# Patient Record
Sex: FEMALE | Race: WHITE | NOT HISPANIC OR LATINO | Employment: OTHER | URBAN - METROPOLITAN AREA
[De-identification: names, ages, dates, MRNs, and addresses within clinical notes are randomized per-mention and may not be internally consistent; named-entity substitution may affect disease eponyms.]

---

## 2018-08-01 ENCOUNTER — TRANSFERRED RECORDS (OUTPATIENT)
Dept: HEALTH INFORMATION MANAGEMENT | Facility: CLINIC | Age: 78
End: 2018-08-01

## 2018-08-01 LAB
CHOLEST SERPL-MCNC: 221 MG/DL (ref 100–199)
CREAT SERPL-MCNC: 0.81 MG/DL (ref 0.57–1.11)
GFR SERPL CREATININE-BSD FRML MDRD: >60 ML/MIN/1.73M2
GLUCOSE SERPL-MCNC: 96 MG/DL (ref 65–100)
HDLC SERPL-MCNC: 58 MG/DL
LDLC SERPL CALC-MCNC: 146 MG/DL
NONHDLC SERPL-MCNC: 163 MG/DL
POTASSIUM SERPL-SCNC: 3.8 MMOL/L (ref 3.5–5)
TRIGL SERPL-MCNC: 87 MG/DL

## 2018-10-08 ENCOUNTER — TRANSFERRED RECORDS (OUTPATIENT)
Dept: HEALTH INFORMATION MANAGEMENT | Facility: CLINIC | Age: 78
End: 2018-10-08

## 2018-10-25 ENCOUNTER — OFFICE VISIT (OUTPATIENT)
Dept: OTHER | Facility: CLINIC | Age: 78
End: 2018-10-25
Attending: SURGERY
Payer: COMMERCIAL

## 2018-10-25 VITALS
WEIGHT: 210 LBS | DIASTOLIC BLOOD PRESSURE: 82 MMHG | SYSTOLIC BLOOD PRESSURE: 138 MMHG | HEART RATE: 72 BPM | BODY MASS INDEX: 37.21 KG/M2 | HEIGHT: 63 IN

## 2018-10-25 DIAGNOSIS — I65.23 BILATERAL CAROTID ARTERY STENOSIS: Primary | ICD-10-CM

## 2018-10-25 DIAGNOSIS — E78.5 HYPERLIPIDEMIA LDL GOAL <70: ICD-10-CM

## 2018-10-25 PROCEDURE — 99204 OFFICE O/P NEW MOD 45 MIN: CPT | Mod: ZP | Performed by: SURGERY

## 2018-10-25 PROCEDURE — G0463 HOSPITAL OUTPT CLINIC VISIT: HCPCS

## 2018-10-25 RX ORDER — GABAPENTIN 100 MG/1
100 CAPSULE ORAL
COMMUNITY
Start: 2018-09-27 | End: 2020-09-08

## 2018-10-25 RX ORDER — FUROSEMIDE 20 MG
20-40 TABLET ORAL
COMMUNITY
Start: 2018-08-01 | End: 2020-09-08

## 2018-10-25 RX ORDER — ROSUVASTATIN CALCIUM 20 MG/1
20 TABLET, COATED ORAL
COMMUNITY
Start: 2018-10-09 | End: 2020-09-08

## 2018-10-25 NOTE — MR AVS SNAPSHOT
After Visit Summary   10/25/2018    Narcisa Harris    MRN: [id number]           Patient Information     Date Of Birth          1940        Visit Information        Provider Department      10/25/2018 2:00 PM Peter Mazariegos MD [facility]      Today's Diagnoses     Bilateral carotid artery stenosis    -  1    Hyperlipidemia LDL goal <70          Care Instructions    Patient to follow up with Primary Care provider regarding elevated blood pressure.            Follow-ups after your visit        Your next 10 appointments already scheduled     Nov 01, 2018 12:00 PM CDT   (Arrive by 11:45 AM)   CTA  HEAD NECK WITH CONTRAST with SHCT1   [facility] ([facility])    [address] [place] [id number] [phone]           How do I prepare for my exam? (Food and drink instructions) **You will have contrast for this exam.** Do not eat or drink for 2 hours before your exam. If you need to take medicine, you may take it with small sips of water. (We may ask you to take liquid medicine as well.)  The day before your exam, drink extra fluids at least six 8-ounce glasses (unless your doctor tells you to restrict your fluids).  How do I prepare for my exam? (Other instructions) Patients over 70 or patients with diabetes or kidney problems: If you haven t had a blood test (creatinine test) within the last 30 days, the Cardiologist/Radiologist may require you to get this test prior to your exam.  What should I wear: Please wear loose clothing, such as a sweat suit or jogging clothes.  Avoid snaps, zippers and other metal. We may ask you to undress and put on a hospital gown.  How long does the exam take: Most scans take less than 20 minutes.  What should I bring: Please bring any scans or X-rays taken at other hospitals, if similar tests were done. Also bring a list of your medicines, including  vitamins, minerals and over-the-counter drugs. It is safest to leave personal items at home.  Do I need a :  No  is needed.  What do I need to tell my doctor? Be sure to tell your doctor: * If you have any allergies. * If there s any chance you are pregnant. * If you are breastfeeding. * If you have diabetes as your medication may need to be adjusted for this exam.  What should I do after the exam: No restrictions, You may resume normal activities.  What is this test: A CT (computed tomography) scan is a series of pictures that allows us to look inside your body. The scanner creates images of the body in cross sections, much like slices of bread. This helps us see any problems more clearly. You may receive contrast (X-ray dye) before or during your scan. Contrast is given through an IV (small needle in your arm).  Who should I call with questions: If you have any questions, please call the Imaging Department where you will have your exam. Directions, parking instructions, and other information is available on our website, [email or web address].              Future tests that were ordered for you today     Open Future Orders        Priority Expected Expires Ordered    CTA Head Neck with Contrast Routine 10/25/2018 10/25/2019 10/25/2018            Who to contact     If you have questions or need follow up information about today's clinic visit or your schedule please contact [facility] directly at [phone].  Normal or non-critical lab and imaging results will be communicated to you by MyChart, letter or phone within 4 business days after the clinic has received the results. If you do not hear from us within 7 days, please contact the clinic through MyChart or phone. If you have a critical or abnormal lab result, we will notify you by phone as soon as possible.  Submit refill requests through [employer] or call your pharmacy and they will forward the refill request to us. Please  "allow 3 business days for your refill to be completed.          Additional Information About Your Visit        Care EveryWhere ID     This is your Care EveryWhere ID. This could be used by other organizations to access your [facility] medical records  [id number]        Your Vitals Were     Pulse Height Breastfeeding? BMI (Body Mass Index)          72 5' 2.5\" (1.588 m) No 37.8 kg/m2         Blood Pressure from Last 3 Encounters:   10/25/18 138/82   09/27/13 146/62   06/21/13 146/56    Weight from Last 3 Encounters:   10/25/18 210 lb (95.3 kg)   09/27/13 227 lb 8 oz (103.2 kg)   06/21/13 232 lb (105.2 kg)               Primary Care Provider Office Phone # Fax #    Mhd Marry Bills [phone]       [facility] [address] [place] [address]        Equal Access to Services     AUTUMN SHARP AH: Hadii aad ku hadasho Soomaali, waaxda luqadaha, qaybta kaalmada adeegyada, waxay concepcionin hayfederico maya . So [facility] [phone].    ATENCIÓN: Si zoilala español, tiene a haq disposición servicios gratuitos de asistencia lingüística. Llame al [phone].    We comply with applicable federal civil rights laws and [place] laws. We do not discriminate on the basis of race, color, national origin, age, disability, sex, sexual orientation, or gender identity.            Thank you!     Thank you for choosing [facility] [place]  for your care. Our goal is always to provide you with excellent care. Hearing back from our patients is one way we can continue to improve our services. Please take a few minutes to complete the written survey that you may receive in the mail after your visit with us. Thank you!             Your Updated Medication List - Protect others around you: Learn how to safely use, store and throw away your medicines at www.disposemymeds.org.          This list is accurate as of 10/25/18  6:08 PM.  Always use your most recent med list.                   " Brand Name Dispense Instructions for use Diagnosis    aspirin 81 MG tablet      Take 1 tablet by mouth daily.        furosemide 20 MG tablet    LASIX     Take 20-40 mg by mouth        gabapentin 100 MG capsule    NEURONTIN     Take 100 mg by mouth        hydrochlorothiazide 25 MG tablet    HYDRODIURIL     Take 1 tablet by mouth daily.        losartan 25 MG tablet    COZAAR     Take 1 tablet by mouth daily.        rosuvastatin 20 MG tablet    CRESTOR     Take 20 mg by mouth

## 2018-10-25 NOTE — NURSING NOTE
"Narcisa Harris is a 78 year old female who presents for:  Chief Complaint   Patient presents with     Consult     New consult for carotid stenosis, patient self referred, had recent US at [facility]         Vitals:    Vitals:    10/25/18 1355 10/25/18 1358   BP: 149/80 138/82   BP Location: Right arm Left arm   Patient Position: Chair Sitting   Cuff Size: Adult Large Adult Large   Pulse: 71 72   Weight: 210 lb (95.3 kg)    Height: 5' 2.5\" (1.588 m)        BMI:  Estimated body mass index is 37.8 kg/(m^2) as calculated from the following:    Height as of this encounter: 5' 2.5\" (1.588 m).    Weight as of this encounter: 210 lb (95.3 kg).    Pain Score:  Data Unavailable        Celine Ambriz"

## 2018-10-25 NOTE — LETTER
[facility] [place] [address]  [place] [address]  Phone: [phone]  Fax: [phone]      2018    Re: Narcisa Harris - 1940    Narcisa Harris and her son came to see me today per recommendation of her physicians. This 78-year-old patient who is quite ndependent has known carotid stenosis.  Was discovered on a life screen exam several years ago and she has had yearly exams.  She has had a progressive worsening of the stenosis but remains asymptomatic and this led to a more formal carotid duplex ultrasound     Her brother is a patient of mine and underwent carotid surgery many years ago with no complications.     Patient has multiple nonspecific complaints.  She has had some headaches on with some soreness in her neck and back muscles but no focal neurological problems.  She does hear a bruit when she is lying down at night.     PMH: Medications: Cozaar, Lasix, aspirin, hydrochlorothiazide            Medical: Hypertension well controlled with medications.                          Denies any cardiac history.                          Dependent edema in both of her legs more on the left.                          Degenerative arthritis with prior left knee replacement                          -Suboptimal results and thus is not interested in s surgery on the right.                          Left carpal tunnel syndrome surgery with good results.     ROS: Never smoked.  No alcohol use.            Does experience shortness of breath but no chest pain with exertion.            Hyperlipidemia but difficulty tolerating all statins.            Tried Crestor more recently needed to stop this due to leg aches.            Complains of leg cramping at night.      FMH: Brother with carotid disease.      Exam: Very pleasant talkative woman.  Alert and orientated x3.  Here with son.             Blood pressure 149/80 right arm and 138/82 left arm.  Pulse 72 regular              Right carotid bruit noted.  HEENT= otherwise normal.             Chest= clear.   Cardiovascular= regular rate.             Abdomen= obese, nontender             Extremities= bilateral calf and ankle swelling but not in the dorsal foot.             +3 palpable dorsalis pedis pulses bilaterally.      Reviewed the images and reports from the carotid duplex from [facility] dated 10/8/2018.  ICA velocities are elevated bilaterally with visible narrowing of the origin of the ICA.  On the right PSV= from 191-241 cm/s.  Relatively normal end-diastolic velocities.  Some turbulence in the waveforms.  On the left there is again a visible stenosis of the origin of the ICA.  PSV= 205 cm/s with some more spectral broadening but still normal end-diastolic velocities.  Antegrade flow in both vertebral arteries.      8/1/2018 laboratory: Potassium= 3.8   Glucose= 96  serum creatinine= 0.81                                    Cholesterol= 221 triglycerides = 87   HDL= 58  LDL= 146.      Impression: #1.  Progressively increasing bilateral carotid stenosis which remains asymptomatic.  This is reached a point where the stenosis may be in the range of 70% at which juncture the stroke rate does slightly begin to increase.  Due to the progression and degree of stenosis presently I would recommend that we evaluate this further with a CTA.  If this confirms high-grade stenoses greater than 80% we would recommend carotid endarterectomy surgery.  I discussed the surgical procedure with our protocol of EEG-shunting if needed-vein patch angioplasty with her and her son.  Most patients stay overnight and are discharged the following day.  We will call her once the CTA images are available.                         #2.  No significant PAD with excellent palpable dorsalis pedis pulses bilaterally.                            #3.  Hyperlipidemia with markedly elevated LDL.  With her known carotid disease and possible silent cardiac disease more  aggressive control of the LDL is indicated with a goal being less than 70.  Unfortunately she has problems with statins at this may need to be evaluated further to see if there is a statin medication she could tolerate.                            #4.   Shortness of breath with exercise.  This likely is multifactorial.  Certainly can be related to her obesity and knee issues.  She may require further evaluation if she needs to undergo surgery.        We will call her with the results of the CTA to make further recommendations.        Peter Mazariegos MD

## 2018-10-25 NOTE — PROGRESS NOTES
[place] [facility]    Narcisa Harris and her son came to see me today per recommendation of her physicians.  This 78-year-old patient who is quite ndependent has known carotid stenosis.  Was discovered on a life screen exam several years ago and she has had yearly exams.  She has had a progressive worsening of the stenosis but remains asymptomatic and this led to a more formal carotid duplex ultrasound    Her brother is a patient of mine and underwent carotid surgery many years ago with no complications.    Patient has multiple nonspecific complaints.  She has had some headaches on with some soreness in her neck and back muscles but no focal neurological problems.  She does hear a bruit when she is lying down at night.    PMH: Medications: Cozaar, Lasix, aspirin, hydrochlorothiazide            Medical: Hypertension well controlled with medications.                           Denies any cardiac history.                           Dependent edema in both of her legs more on the left.                           Degenerative arthritis with prior left knee replacement                                        -Suboptimal results and thus is not interested in s                                                  surgery on the right.                            Left carpal tunnel syndrome surgery with good results.    ROS: Never smoked.  No alcohol use.            Does experience shortness of breath but no chest pain with exertion.             Hyperlipidemia but difficulty tolerating all statins.                  Tried Crestor more recently needed to stop this due to leg aches.             Complains of leg cramping at night.      FMH: Brother with carotid disease.      Exam: Very pleasant talkative woman.  Alert and orientated x3.  Here with son.              Blood pressure 149/80 right arm and 138/82 left arm.  Pulse 72 regular               Right carotid bruit noted.  HEENT= otherwise normal.                Chest=  clear.   Cardiovascular= regular rate.                 Abdomen= obese, nontender                 Extremities= bilateral calf and ankle swelling but not in the dorsal foot.                          +3 palpable dorsalis pedis pulses bilaterally.      Reviewed the images and reports [facility].  ICA velocities are elevated bilaterally with visible narrowing of the origin of the ICA.  On the right PSV= from 191-241 cm/s.  Relatively normal end-diastolic velocities.  Some turbulence in the waveforms.  On the left there is again a visible stenosis of the origin of the ICA.  PSV= 205 cm/s with some more spectral broadening but still normal end-diastolic velocities.  Antegrade flow in both vertebral arteries.      8/1/2018 laboratory: Potassium= 3.8   Glucose= 96  serum creatinine= 0.81                                    Cholesterol= 221 triglycerides = 87   HDL= 58                                    LDL= 146.      Impression: #1.  Progressively increasing bilateral carotid stenosis which remains asymptomatic.  This is reached a point where the stenosis may be in the range of 70% at which juncture the stroke rate does slightly begin to increase.  Due to the progression and degree of stenosis presently I would recommend that we evaluate this further with a CTA.  If this confirms high-grade stenoses greater than 80% we would recommend carotid endarterectomy surgery.  I discussed the surgical procedure with our protocol of EEG-shunting if needed-vein patch angioplasty with her and her son.  Most patients stay overnight and are discharged the following day.  We will call her once the CTA images are available.                        #2.  No significant PAD with excellent palpable dorsalis pedis pulses bilaterally.                           #3.  Hyperlipidemia with markedly elevated LDL.  With her known carotid disease and possible silent cardiac disease more aggressive control of the LDL  is indicated with a goal being less than 70.  Unfortunately she has problems with statins at this may need to be evaluated further to see if there is a statin medication she could tolerate.                           #4.   Shortness of breath with exercise.  This likely is multifactorial.  Certainly can be related to her obesity and knee issues.  She may require further evaluation if she needs to undergo surgery.      Spent 40 minutes in the office today with over 50% in counseling the patient and her son.  We will call her with the results of the CTA to make further recommendations.         Peter Mazariegos MD     Please route or send letter to:  Primary Care Provider (PCP) Dr Bills                           #3.   Bilateral calf edema.  Left appeared to be a exacerbated with her knee replacement surgery.  Skin is still soft and supple with no lesions.  Unlikely she will be able to appropriately wear compression stockings due to her body habitus.  Discussed with her and her son.                          Normal sensation.  No ulcers.

## 2018-11-01 ENCOUNTER — HOSPITAL ENCOUNTER (OUTPATIENT)
Dept: CT IMAGING | Facility: CLINIC | Age: 78
Discharge: HOME | End: 2018-11-01
Attending: SURGERY | Admitting: SURGERY
Payer: MEDICARE

## 2018-11-01 DIAGNOSIS — I65.23 BILATERAL CAROTID ARTERY STENOSIS: ICD-10-CM

## 2018-11-01 LAB
CREAT BLD-MCNC: 0.9 MG/DL (ref 0.52–1.04)
GFR SERPL CREATININE-BSD FRML MDRD: 61 ML/MIN/1.7M2

## 2018-11-01 PROCEDURE — 82565 ASSAY OF CREATININE: CPT

## 2018-11-01 PROCEDURE — 25000125 ZZHC RX 250: Performed by: SURGERY

## 2018-11-01 PROCEDURE — 25000128 H RX IP 250 OP 636: Performed by: SURGERY

## 2018-11-01 PROCEDURE — 70496 CT ANGIOGRAPHY HEAD: CPT

## 2018-11-01 RX ORDER — IOPAMIDOL 755 MG/ML
70 INJECTION, SOLUTION INTRAVASCULAR ONCE
Status: COMPLETED | OUTPATIENT
Start: 2018-11-01 | End: 2018-11-01

## 2018-11-01 RX ADMIN — SODIUM CHLORIDE, PRESERVATIVE FREE 90 ML: 5 INJECTION INTRAVENOUS at 11:44

## 2018-11-01 RX ADMIN — IOPAMIDOL 70 ML: 755 INJECTION, SOLUTION INTRAVENOUS at 11:44

## 2018-11-02 ENCOUNTER — TELEPHONE (OUTPATIENT)
Dept: OTHER | Facility: CLINIC | Age: 78
End: 2018-11-02

## 2018-11-02 DIAGNOSIS — I65.23 BILATERAL CAROTID ARTERY STENOSIS: Primary | ICD-10-CM

## 2018-11-02 NOTE — TELEPHONE ENCOUNTER
[facility]    Narcisa Harris was seen recently at the vascular office for evaluation of her asymptomatic carotid stenosis.  She noted progressively increasing velocities from the duplex performed at [facility] from 10/8/2018.  Due to these progressive changes we felt the CTA would be beneficial.    This was performed yesterday.  She does have calcification at the carotid bifurcations bilaterally.  However, on the right side the stenosis only 30%.  On the left this is 65%.  The left carotid stenosis would not warrant intervention at this time but continued follow-up is necessary and I discussed this with the patient.  We would repeat the carotid duplex ultrasound in 9 months to see if there is any compression particular in the left side.    However, we feel that risk factor control is very important due to her degree of carotid stenosis to help decrease progression.  Her recent LDL= 146.  She is not on a statin because she has had problems with this.  I do feel more aggressive control is indicated especially with her known vascular issues.  I will have my office set up an appointment with our vascular medicine specialist in my clinic to see if we can find a medication that she can tolerate to help control this problem.  Patient is very interested in addressing this problem.    She also complains that she has some swallowing issues and coughing episodes.  This occurred last night.  She asked who she could see it may be best that she starts with the ENT physician.      We will call her to schedule appointment with Dr. Simon or 1 of his associates vascular medicine in my office.  Also to schedule the 9-month carotid duplex follow-up.    Peter Mazariegos MD     Please route or send letter to:  Primary Care Provider (PCP)

## 2019-05-28 ENCOUNTER — TELEPHONE (OUTPATIENT)
Dept: OTHER | Facility: CLINIC | Age: 79
End: 2019-05-28

## 2019-05-28 NOTE — TELEPHONE ENCOUNTER
Reviewed imaging pt had done at [place].  No carotid imaging done, CT head/brain w/o contrast did not evaluate carotid arteries.  Pt to keep scheduled 5/30/19 bilateral carotid US and OV with Dr. Mazariegos.  Contacted pt and she verbalized understanding.    Tamera Mcmahon, LORENN, RN

## 2019-05-28 NOTE — TELEPHONE ENCOUNTER
Patient called to let us know that she fell last week and ended up in [facility] (05/23/19).  She hit her head, injured her knee cap and has a black eye. She has a full black knee brace on for 5-6 days. They did take quite a few XR's.  Patient is wondering if its ok for her to still have her US Bilat Carotid on Thursday (05/30/19) prior to her Dr Mazariegos appt? Her number is [phone] - she doesn't have an answering machine so please keep trying her if there's no answer.

## 2019-05-29 NOTE — PROGRESS NOTES
[place] [facility]    Narcisa Harris is a history of asymptomatic carotid stenosis.  This 79-year-old patient underwent a CTA in November 2018.  Both carotid bifurcations were calcified with a 30% stenosis in the right and 65% stenosis of the left.    Also history of significant hyperlipidemia with an LDL= 146.  She is presently on Crestor and I do not have results of her LDL on this medication.  Also takes aspirin, Cozaar, HCTZ and Lasix.    PMH: Medications reviewed in epic.    She lives independently.  She slipped and fell over this week due to new shoes.  She came down hitting her right knee with a significant ecchymosis but no fracture.  Also contused her right periorbital area and scalp with no fracture.  She is wearing a splint on her right leg due to the injury and using a wheelchair today but recovering.  She is confident that she actually tripped on her new shoes and this was not really related to a temporary weakness which could have represented a left hemispheric TIA.    She does noticed dependent edema of both her ankles at the end of the day.  She has compression stockings with it very difficult for her to wear.    Exam: Alert and appropriate.  Contusions over right periorbital area.  Wearing a removable splint on her right leg.  Wearing glasses that did not break with her fall.  Quite talkative.  Blood pressure 143/75.  Pulse 77.  HEENT= unremarkable with no carotid bruits  Chest= clear  Cardiovascular= regular rate  Extremities= heavyset calfs bilaterally.  No pedal edema.  Normal sensation.  +3 palpable dorsalis pedis pulses bilaterally.      Carotid duplex today reveals stable stenoses of the left greater than right carotid arteries.  Peak systolic velocity is 179 cm/s on the right and 210 cm/s in the left which is consistent with the prior velocities by duplex prior to the CTA.      Impression: #1.  Left greater than right moderately severe though asymptomatic stable carotid  stenosis.  No intervention indicated at this time unless symptoms should develop with the stenosis should progress.  Plan repeat duplex ultrasound 1 year.                         #2.  No evidence of peripheral artery disease with excellent distal pulses.  Discussed her dependent edema.  This is not a major concern.  As mentioned compression stockings are very difficult for her to wear and probably will not would not make a significant difference.                         #3.   With her carotid disease risk factor control is very important.  She is a non-smoker.  She is on Crestor for hyperlipidemia with the LDL goal less than 70 with her underlying issues.    I spent 20 minutes with the patient today with over 50% counseling.      Peter Mazariegos MD     CC  Patient Care Team:  Vivienne Bills MD as PCP - General (Family Practice)

## 2019-05-30 ENCOUNTER — HOSPITAL ENCOUNTER (OUTPATIENT)
Dept: ULTRASOUND IMAGING | Facility: CLINIC | Age: 79
Discharge: HOME | End: 2019-05-30
Attending: SURGERY | Admitting: SURGERY
Payer: MEDICARE

## 2019-05-30 ENCOUNTER — OFFICE VISIT (OUTPATIENT)
Dept: OTHER | Facility: CLINIC | Age: 79
End: 2019-05-30
Attending: SURGERY
Payer: MEDICARE

## 2019-05-30 VITALS — HEART RATE: 77 BPM | SYSTOLIC BLOOD PRESSURE: 143 MMHG | DIASTOLIC BLOOD PRESSURE: 75 MMHG

## 2019-05-30 DIAGNOSIS — I65.23 CAROTID STENOSIS, ASYMPTOMATIC, BILATERAL: Primary | ICD-10-CM

## 2019-05-30 DIAGNOSIS — E78.5 HYPERLIPIDEMIA LDL GOAL <70: ICD-10-CM

## 2019-05-30 DIAGNOSIS — I65.23 BILATERAL CAROTID ARTERY STENOSIS: ICD-10-CM

## 2019-05-30 PROCEDURE — G0463 HOSPITAL OUTPT CLINIC VISIT: HCPCS

## 2019-05-30 PROCEDURE — 99214 OFFICE O/P EST MOD 30 MIN: CPT | Mod: ZP | Performed by: SURGERY

## 2019-05-30 PROCEDURE — 93880 EXTRACRANIAL BILAT STUDY: CPT

## 2019-05-30 NOTE — PROGRESS NOTES
"Narcisa Harris is a 79 year old female who presents for:  Chief Complaint   Patient presents with     RECHECK     Bilateral Carotid (2:45 VHC, 3:45 WRO) History of bilateral carotid artery stenosis; 9 month follow up to 10/25/18 appointment         Vitals:    Vitals:    05/30/19 1455   BP: 143/75   BP Location: Left arm   Patient Position: Chair   Cuff Size: Adult Large   Pulse: 77       BMI:  Estimated body mass index is 37.8 kg/m  as calculated from the following:    Height as of 10/25/18: 5' 2.5\" (1.588 m).    Weight as of 10/25/18: 210 lb (95.3 kg).    Pain Score:  Data Unavailable        Santo Cheney    "

## 2019-05-30 NOTE — LETTER
[facility] [address]  [place] [address]  Phone: [phone]  Fax: [phone]      May 30, 2019       Re: Narcisa Harris - 1940    Narcisa Harris is a history of asymptomatic carotid stenosis.  This 79-year-old patient underwent a CTA in 2018.  Both carotid bifurcations were calcified with a 30% stenosis in the right and 65% stenosis of the left.     Also history of significant hyperlipidemia with an LDL= 146.  She is presently on Crestor and I do not have results of her LDL on this medication.  Also takes aspirin, Cozaar, HCTZ and Lasix.     PMH: Medications reviewed in epic.     She lives independently.  She slipped and fell over this week due to new shoes.  She came down hitting her right knee with a significant ecchymosis but no fracture.  Also contused her right periorbital area and scalp with no fracture.  She is wearing a splint on her right leg due to the injury and using a wheelchair today but recovering.  She is confident that she actually tripped on her new shoes and this was not really related to a temporary weakness which could have represented a left hemispheric TIA.     She does noticed dependent edema of both her ankles at the end of the day.  She has compression stockings with it very difficult for her to wear.     Exam: Alert and appropriate.  Contusions over right periorbital area.  Wearing a removable splint on her right leg.  Wearing glasses that did not break with her fall.  Quite talkative.  Blood pressure 143/75.  Pulse 77.  HEENT= unremarkable with no carotid bruits  Chest= clear  Cardiovascular= regular rate  Extremities= heavyset calfs bilaterally.  No pedal edema.  Normal sensation.  +3 palpable dorsalis pedis pulses bilaterally.      Carotid duplex today reveals stable stenoses of the left greater than right carotid arteries.  Peak systolic velocity is 179 cm/s on the right and 210 cm/s in the left which is consistent  with the prior velocities by duplex prior to the CTA.        Impression: #1.  Left greater than right moderately severe though asymptomatic stable carotid stenosis.  No intervention indicated at this time unless symptoms should develop with the stenosis should progress.  Plan repeat duplex ultrasound 1 year.                          #2.  No evidence of peripheral artery disease with excellent distal pulses.  Discussed her dependent edema.  This is not a major concern.  As mentioned compression stockings are very difficult for her to wear and probably will not would not make a significant difference.                          #3.   With her carotid disease risk factor control is very important.  She is a non-smoker.  She is on Crestor for hyperlipidemia with the LDL goal less than 70 with her underlying issues.             Peter Mazariegos MD

## 2020-04-17 DIAGNOSIS — I65.23 CAROTID STENOSIS, ASYMPTOMATIC, BILATERAL: Primary | ICD-10-CM

## 2020-05-18 ENCOUNTER — TELEPHONE (OUTPATIENT)
Dept: OTHER | Facility: CLINIC | Age: 80
End: 2020-05-18

## 2020-05-18 DIAGNOSIS — I65.23 BILATERAL CAROTID ARTERY STENOSIS: Primary | ICD-10-CM

## 2020-05-18 NOTE — TELEPHONE ENCOUNTER
"I called Narcisa and discussed that she is due for carotid US and 1 year follow up with Dr. Mazariegos.  Narcisa reports a few weeks ago she started experiencing neck pain when she lays down to go to sleep which appears to be positional. She reports dizziness once she wakes up in the morning that comes and goes with intermittent chest pain and some shortness of breath. She denies chest pain or shortness of breath at this moment but was advised to present to the ED should she develop persistent chest pain and she is in agreement. I also advised her to call her PCP and get in right away for evaluation of her multiple symptoms. \"I didn't want to bother anyone because of the COVID-19\" I encouraged Narcisa to always call if she is having symptoms or concerns. She verbalized she will call her PCP to be seen.    Routing to Dr. Mazariegos to advise on patients needed annual carotid surveillance and if patient needs US and what type of visit or if can wait post-covid.    Paris HARDY, RN    [facility]  [facility]  Office: [phone]  Fax: [phone]        "

## 2020-05-18 NOTE — TELEPHONE ENCOUNTER
May 18, 2020    Patient called [facility] to schedule f/u appt with [facility] that was due at the beginning of May.     Patient stated that she has been dizzy and lightheaded in the morning before she takes her BP meds and that her head is sore.     She has orders for Bilat Carotid US.     Routing to RN as high priority for review and advisement.     US and [facility] appts on 5/21/2020 are being reserved for patient.    Yeni Dang    [facility]  Office: [phone]  Fax [phone]

## 2020-05-21 PROBLEM — I10 HTN (HYPERTENSION): Status: ACTIVE | Noted: 2017-07-27

## 2020-05-21 PROBLEM — E78.2 MIXED HYPERLIPIDEMIA: Status: ACTIVE | Noted: 2017-07-27

## 2020-05-21 NOTE — TELEPHONE ENCOUNTER
Pt called back, reports she had imaging recently completed and was told by Dr. Ileana Hilton to call us for appointment soon due to severity.  Pt reports continued dizziness same as before, no further symptoms at this time.      LOV 5/30/19 with Dr. Mazariegos.      5/20/20 Bilateral Carotid U/S in CareEverywhere from [facility] shows   IMPRESSION:  1. Severe plaque formation, velocities consistent with greater than 70% stenosis in the right internal carotid artery.  2. Severe plaque formation, velocities consistent with greater than 70% stenosis in the left internal carotid artery.  3. Flow within the vertebral arteries is antegrade.  4. Elevated velocity in the right external carotid artery compatible with stenosis.     [facility], they will push image to PACS today.     Pt needs CTA head/neck and in person follow up with Dr. Yair arboleda.     Routing to  to coordinate.     LOREN RowleyN, RN  [facility]

## 2020-05-21 NOTE — TELEPHONE ENCOUNTER
May 21, 2020    Patient is scheduled for CTA on 5/27/2020 and in-person f/u appt on 6/4/2020 with [facility].     Yeni Dang    [facility]  Office: [phone]  Fax [phone]

## 2020-05-26 ENCOUNTER — TELEPHONE (OUTPATIENT)
Dept: OTHER | Facility: CLINIC | Age: 80
End: 2020-05-26

## 2020-05-26 NOTE — TELEPHONE ENCOUNTER
Patient called stating she just had CT of her head at [facility] with her PCP for dizziness and would like to know if she still needs the CT scan from Dr. Mazariegos.  I explained that Dr. Mazariegos ordered a CTA of her head and neck due to level of stenosis on her bilateral carotid US and this is not the same as a CT head. She verbalized understanding. She states she has been experiencing off and on symptoms of headaches, dizziness and chest pain and recently saw her PCP ([facility] care everywhere) for this and was recommended to follow up with her vascular MD. Narcisa is scheduled for her CTA head and neck tomorrow and her appointment with Dr. Mazariegos changed from 6/4/20 to 5/28/20 due to her intermittent symptoms. Patient instructed to present to the ED for any stroke like symptoms and she verbalized understanding.     Paris HARDY, RN    [facility]  Office: [phone]  Fax: [phone]

## 2020-05-27 ENCOUNTER — ANCILLARY PROCEDURE (OUTPATIENT)
Dept: CT IMAGING | Facility: CLINIC | Age: 80
End: 2020-05-27
Attending: SURGERY
Payer: MEDICARE

## 2020-05-27 DIAGNOSIS — I65.23 BILATERAL CAROTID ARTERY STENOSIS: ICD-10-CM

## 2020-05-27 PROCEDURE — 70498 CT ANGIOGRAPHY NECK: CPT | Mod: TC

## 2020-05-27 PROCEDURE — 70496 CT ANGIOGRAPHY HEAD: CPT | Mod: TC

## 2020-05-27 RX ORDER — IOPAMIDOL 755 MG/ML
70 INJECTION, SOLUTION INTRAVASCULAR ONCE
Status: COMPLETED | OUTPATIENT
Start: 2020-05-27 | End: 2020-05-27

## 2020-05-27 RX ADMIN — IOPAMIDOL 70 ML: 755 INJECTION, SOLUTION INTRAVASCULAR at 14:05

## 2020-05-27 NOTE — PROGRESS NOTES
[place] VASCULAR [facility]    Narcisa Harris is 80 years old and has a history of asymptomatic carotid stenosis.  CTA from November 2018 revealed a calcified stenosis bilateral with the right having a 30% stenosis in the left to 65% stenosis.    She also had a history of hyperlipidemia well controlled with Crestor along with hypertension.      Last carotid duplex ultrasound on 5/30/2019 revealed a right ICA PSV= 179 cm/s and left ICA PSV= 210 cm/s.    However, patient's had increasing somewhat vague neurological symptoms.  She will notice lightheadedness when she gets up.  Also notices a throbbing hot sensation to her midline upper scalp and sometimes to the right side of the scalp.  Occasional some blurry vision but not unilateral that rapidly resolves.  No focal neurological problems such as arm or leg weakness or speech changes.    She mentioned this to her physician.  Carotid duplex ultrasound was repeated on 5/20/2020 with a right ICA PSV =236 cm/s in the left PSV= 240 cm/s.  Since this was worse this led to the CTA in her office visit today.      PMH: Medications: Cozaar, Lasix, Neurontin, Crestor, HCTZ, aspirin           Non-smoker.  Lives independently.         No evidence of PAD with excellent distal pulses in the past.         Chronic bilateral calf swelling slightly improved in the morning  .           Try compression but they actually were too tight near her knee              Due to her body habitus and actually made this worse.              No history of leg ulcerations.      Her brother ( JOAQUIM Velazquez) is a patient of mine but undergone carotid surgery several years ago and has done well so patient does have an understanding of the surgery.    ROS: Does complain of a tingling sensation to her left forearm and hand that can awaken her at night.  This rapidly resolves.  Does tend to sleep on her left side which exacerbates this situation.  Has had previous carpal tunnel surgeries.      Underwent CTA  on 5/27/2020.  This revealed a less than 50% stenosis of the right carotid bifurcation and a severely 76% stenosis of the proximal left internal carotid artery that is worsened from the CTA performed on 11/1/2018.  No significant intracranial vascular disease.      Exam: Very pleasant alert woman.  Normal affect.  Obese.             Blood pressure 154/83.  Pulse 92 regular             Right greater than left carotid bruit.             Chest= clear to auscultation             Cardiovascular= regular rate with grade 1/6 murmur.              Abdomen= obese, nontender             Extremities= significant bilateral calf edema that stops at her ankles.             +3 palpable dorsalis pedis pulses bilaterally.  No ulcers.                     Normal sensation.    I reviewed the CTA images with the patient today.  Calcified plaque at the origin of the left ICA which is in normal location without a high bifurcation.  Stenosis of essentially 77% noted.  On the right side of the disease is less with a stenosis of 52%.  No major intra-cerebral disease and vertebral arteries are patent.    Impression: #1.  Progressively increasing left carotid stenosis now greater than 70%.  Her complaints of her head and vision are extremely vague and I do not feel related to any neurological process or carotid disease.  However, with the increasing stenosis there is certainly a chance of a embolus emanating from the plaque which could cause neurological event.  I do feel it is very reasonable to consider a left CEA.  We discussed the procedure with interoperative monitoring and shunting been performed and possible patch angioplasty depending on the size of the artery.  Most patients are able to be discharged the following day with no specific restrictions to her activities.  Patient is on the children's aspirin would continue on this up to the day of surgery.  For 1 month after surgery we use the baby aspirin along with Plavix or dipyridamole  as he artery site is healing.                            #2.  More moderate stenosis of the right carotid bifurcation.  Would recommend conservative follow-up.                            #3.   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  Discussed this with her.  No intervention planned.    Spent 30 minutes with the patient today with over 50% in counseling.  All questions answered.  She would like to proceed with the carotid surgery as soon as feasible.    Peter Mazariegos MD     CC  Patient Care Team:  Vivienne Bills MD as PCP - General (Family Practice)

## 2020-05-28 ENCOUNTER — HOSPITAL ENCOUNTER (INPATIENT)
Facility: CLINIC | Age: 80
Setting detail: SURGERY ADMIT
End: 2020-05-28
Attending: SURGERY | Admitting: SURGERY
Payer: MEDICARE

## 2020-05-28 ENCOUNTER — OFFICE VISIT (OUTPATIENT)
Dept: OTHER | Facility: CLINIC | Age: 80
End: 2020-05-28
Attending: SURGERY
Payer: MEDICARE

## 2020-05-28 VITALS — HEART RATE: 92 BPM | SYSTOLIC BLOOD PRESSURE: 154 MMHG | DIASTOLIC BLOOD PRESSURE: 82 MMHG

## 2020-05-28 DIAGNOSIS — E78.5 HYPERLIPIDEMIA LDL GOAL <70: ICD-10-CM

## 2020-05-28 DIAGNOSIS — I65.23 CAROTID STENOSIS, NON-SYMPTOMATIC, BILATERAL: Primary | ICD-10-CM

## 2020-05-28 DIAGNOSIS — I65.23 BILATERAL CAROTID ARTERY STENOSIS: Primary | ICD-10-CM

## 2020-05-28 PROCEDURE — G0463 HOSPITAL OUTPT CLINIC VISIT: HCPCS

## 2020-05-28 PROCEDURE — 99214 OFFICE O/P EST MOD 30 MIN: CPT | Mod: ZP | Performed by: SURGERY

## 2020-05-28 NOTE — NURSING NOTE
Patient Education    Procedure: LEFT CAROTID ENDARTERECTOMY WITH EEG  Diagnosis: LEFT CAROTID ARTERY STENOSIS  Anticoagulation Instruction: N/A  Pre-Operative Physical Exam: You need to have a pre-op physical exam within 30 days of your procedure. Your procedure may be cancelled if you do not have a current History and Physical. Call your PCP's office to schedule.  Allergies:  Updated in Epic  Bowel Prep: N/A  Post Procedure Education: [facility] patient post-procedure fact sheet reviewed with patient.    COVID-19 instructions for isolation and pre testing reviewed with pt.    Learner(s):patient  Method: Listening, Reading  Barriers to Learning:No Barrier  Outcome: Patient did verbalize understanding of above education.    Tamera Mcmahon, LORENN, RN-[facility]

## 2020-05-28 NOTE — NURSING NOTE
"Narcisa Harris is a 80 year old female who presents for:  Chief Complaint   Patient presents with     RECHECK     Follow-up to 5/30/19 with Dr. Mazariegos. Bilat Carotid US done by [facility] on 5/20/20. Requested images to be pushed to PACS. CTA HEAD/NECK in Epic 5/27/20 - 5/26/20 co        Vitals:    Vitals:    05/28/20 1521   BP: (!) 154/82   BP Location: Left arm   Patient Position: Chair   Cuff Size: Adult Large   Pulse: 92       BMI:  Estimated body mass index is 37.8 kg/m  as calculated from the following:    Height as of 10/25/18: 5' 2.5\" (1.588 m).    Weight as of 10/25/18: 210 lb (95.3 kg).    Pain Score:  Data Unavailable        Natali Scanlon MA    "

## 2020-05-29 DIAGNOSIS — Z11.59 ENCOUNTER FOR SCREENING FOR OTHER VIRAL DISEASES: Primary | ICD-10-CM

## 2020-06-08 ENCOUNTER — TELEPHONE (OUTPATIENT)
Dept: OTHER | Facility: CLINIC | Age: 80
End: 2020-06-08

## 2020-06-08 NOTE — TELEPHONE ENCOUNTER
Called Rica at [facility] back [phone], tried 2x, on hold greater than 5 minutes with no answer.     LOREN RowleyN, RN  [facility] Vascular [place]

## 2020-06-08 NOTE — TELEPHONE ENCOUNTER
[facility]    Who is the name of the provider?:  Yair        What is the location you see this provider at?: [place]    Reason for call:  Re new findings for upcoming surgery 6/12.  Needs possible cardiology intervention before surgery.     Can we leave a detailed message on this number?  YES

## 2020-06-10 NOTE — TELEPHONE ENCOUNTER
"Reviewed records via Care Everywhere.    Patient was admitted to [facility] on 6/8/20.    \"As part of her preoperative work up, she had a NM stress 6/5/20 that showed inferior and apical ischemia with left ventricular ejection fraction 83%. Coronary angiogram was recommended, but patient presented to emergency department with worsening exertional and some rest chest pain and back pain prior to that being scheduled.\"    6/9/20 coronary angiogram completed, multi-level vessel disease with recommendation for surgical revascularization.      Planned CABG for 6/11/20.  I contacted pt's son Matias, who is listed as emergency contact and confirmed with him that the carotid surgery will be postponed.  I requested that pt contact our clinic once she has recovered from the CABG to discuss timing of carotid surgery.  Matias verbalized understanding and appreciated the call.    Routing to surgery scheduler to cancel 6/12/20 surgery.    Tamera Mcmahon, LORENN, RN-[facility]    "

## 2020-08-13 ENCOUNTER — TELEPHONE (OUTPATIENT)
Dept: OTHER | Facility: CLINIC | Age: 80
End: 2020-08-13

## 2020-08-13 NOTE — TELEPHONE ENCOUNTER
Patient was scheduled with Dr. Mazariegos for a left carotid endarterectomy with EEG on 6/12/20. During her pre-op, she was sent for a stress test and then had to have heart surgery. Surgery was cancelled with Dr. Mazariegos.    Patient is now calling to reschedule. She states that she saw her cardiologist, Dr. Bradshaw at [facility], and he told her she was clear to reschedule the surgery. I told her that I wasn't sure if she will need another appointment with Dr. Mazariegos before rescheduling, and would call her back once I notify him.

## 2020-08-17 NOTE — TELEPHONE ENCOUNTER
RN spoke with patient and assisted scheduling patient for a return phone visit on 8/18/20 with Dr. Mazariegos.  Pt appreciated the call and had no further questions.    Tamera Mcmahon, BSN, RN-[facility] Vascular [place]

## 2020-08-18 ENCOUNTER — VIRTUAL VISIT (OUTPATIENT)
Dept: OTHER | Facility: CLINIC | Age: 80
End: 2020-08-18
Attending: SURGERY
Payer: MEDICARE

## 2020-08-18 VITALS — DIASTOLIC BLOOD PRESSURE: 54 MMHG | SYSTOLIC BLOOD PRESSURE: 144 MMHG

## 2020-08-18 DIAGNOSIS — I65.23 BILATERAL STENOSIS OF CAROTID ARTERIES GREATER THAN 50%: Primary | ICD-10-CM

## 2020-08-18 PROCEDURE — 99442 ZZC PHYSICIAN TELEPHONE EVALUATION 11-20 MIN: CPT | Performed by: SURGERY

## 2020-08-18 RX ORDER — OMEPRAZOLE 40 MG/1
CAPSULE, DELAYED RELEASE ORAL
COMMUNITY
Start: 2019-12-12 | End: 2020-09-08

## 2020-08-18 RX ORDER — METOPROLOL SUCCINATE 50 MG/1
TABLET, EXTENDED RELEASE ORAL
COMMUNITY
Start: 2020-06-05 | End: 2020-09-08

## 2020-08-18 RX ORDER — FUROSEMIDE 40 MG
40 TABLET ORAL
COMMUNITY
Start: 2020-06-25 | End: 2020-09-08

## 2020-08-18 RX ORDER — AMIODARONE HYDROCHLORIDE 200 MG/1
TABLET ORAL
COMMUNITY
Start: 2020-07-20 | End: 2020-09-08

## 2020-08-18 RX ORDER — POLYETHYLENE GLYCOL 3350 17 G/17G
17 POWDER, FOR SOLUTION ORAL
COMMUNITY
Start: 2020-06-30 | End: 2020-09-08

## 2020-08-18 RX ORDER — POTASSIUM CHLORIDE 750 MG/1
10 TABLET, EXTENDED RELEASE ORAL
COMMUNITY
Start: 2020-06-30 | End: 2020-09-08

## 2020-08-18 RX ORDER — ROSUVASTATIN CALCIUM 10 MG/1
10 TABLET, COATED ORAL EVERY OTHER DAY
Status: ON HOLD | COMMUNITY
Start: 2020-05-20 | End: 2022-09-08

## 2020-08-18 RX ORDER — CLOPIDOGREL BISULFATE 75 MG/1
75 TABLET ORAL DAILY
COMMUNITY
Start: 2020-07-10 | End: 2020-09-08

## 2020-08-18 NOTE — PROGRESS NOTES
"Narcisa Harris is a 80 year old female who is being evaluated via a billable telephone visit.      The patient has been notified of following:     \"This telephone visit will be conducted via a call between you and your physician/provider. We have found that certain health care needs can be provided without the need for a physical exam.  This service lets us provide the care you need with a short phone conversation.  If a prescription is necessary we can send it directly to your pharmacy.  If lab work is needed we can place an order for that and you can then stop by our lab to have the test done at a later time.    Telephone visits are billed at different rates depending on your insurance coverage. During this emergency period, for some insurers they may be billed the same as an in-person visit.  Please reach out to your insurance provider with any questions.    If during the course of the call the physician/provider feels a telephone visit is not appropriate, you will not be charged for this service.\"    Patient has given verbal consent for Telephone visit?  Yes    What phone number would you like to be contacted at? [phone]    How would you like to obtain your AVS? Mail a copy      Radha Mcmahon RN    "

## 2020-08-18 NOTE — PROGRESS NOTES
[facility]    Narcisa Harris has been scheduled for left carotid enterectomy.  Just before her preoperative physical with her physician she started noticing shortness of breath.  She saw her physician who performed a stress test that was abnormal.  She was sent to [facility] for  cardiac evaluation.  She underwent a cardiac bypass X3 6/2020 complicated by postoperative paroxysmal atrial fibrillation she saw her cardiologist Dr. Bradshaw at [facility] who cleared her for her carotid surgery.    Patient has done well following her cardiac surgery.  She does have chronic mild shortness of breath but much improved.  No discomfort with her median sternotomy.  She did develop a cellulitis in her left leg from the vein harvest which has resolved.    5/27/2020 CTA revealed a less than 50% stenosis right carotid bifurcation with a 76% stenosis of the left proximal ICA       PMH: Medications:  Crestor, Prilosec as needed,ASA, Toprol-XL                       Recently started apixaban 5 mg twice daily                scheduled for a Ziehl patch monitor in September      We had a telephone conference today with the ongoing COVID-19 pandemic I spoke with the patient and her son.  We were able to update her medications which are listed above.    I discussed the carotid surgery again with her and answered all questions.  We will have her hold her apixaban for 2 days prior to procedure (taking this for intermittent atrial fibrillation and does not need to be bridged).  She will take her Toprol-XL and Crestor and aspirin up to surgery.    Peter Mazariegos MD       Patient Care Team:  Vivienne Bills MD as PCP - General (Family Practice)        We spent 15 minutes on the phone today with over 50% in counseling.       Peter Mazariegos MD

## 2020-08-19 DIAGNOSIS — Z11.59 ENCOUNTER FOR SCREENING FOR OTHER VIRAL DISEASES: Primary | ICD-10-CM

## 2020-08-19 DIAGNOSIS — I65.23 CAROTID STENOSIS, ASYMPTOMATIC, BILATERAL: Primary | ICD-10-CM

## 2020-09-03 ENCOUNTER — TELEPHONE (OUTPATIENT)
Dept: OTHER | Facility: CLINIC | Age: 80
End: 2020-09-03

## 2020-09-03 NOTE — TELEPHONE ENCOUNTER
Type of surgery: LEFT CAROTID ENDARTERECTOMY WITH EEG   Location of surgery: [facility]  Date and time of surgery: 9/9/20 @ 10:55 AM  Surgeon: DR. MILLER  Pre-Op Appt Date: PT TO SCHEDULE  Post-Op Appt Date: PT TO SCHEDULE   Packet sent out: Yes  Pre-cert/Authorization completed:  Yes  Date: 9/3/20

## 2020-09-05 DIAGNOSIS — Z11.59 ENCOUNTER FOR SCREENING FOR OTHER VIRAL DISEASES: ICD-10-CM

## 2020-09-05 PROCEDURE — U0003 INFECTIOUS AGENT DETECTION BY NUCLEIC ACID (DNA OR RNA); SEVERE ACUTE RESPIRATORY SYNDROME CORONAVIRUS 2 (SARS-COV-2) (CORONAVIRUS DISEASE [COVID-19]), AMPLIFIED PROBE TECHNIQUE, MAKING USE OF HIGH THROUGHPUT TECHNOLOGIES AS DESCRIBED BY CMS-2020-01-R: HCPCS | Performed by: SURGERY

## 2020-09-06 LAB
SARS-COV-2 RNA SPEC QL NAA+PROBE: NOT DETECTED
SPECIMEN SOURCE: NORMAL

## 2020-09-08 ENCOUNTER — TELEPHONE (OUTPATIENT)
Dept: OTHER | Facility: CLINIC | Age: 80
End: 2020-09-08

## 2020-09-08 RX ORDER — METOPROLOL TARTRATE 25 MG/1
25 TABLET, FILM COATED ORAL 2 TIMES DAILY
COMMUNITY

## 2020-09-08 NOTE — PROGRESS NOTES
PTA medications updated by Medication Scribe prior to surgery via phone call with patient      -LAST DOSES ENTERED BY NURSE-    Comments:    Medication history sources: Patient, Patient's family/friend (SON -Tyson), Tod, H&P and Patient's home med list  Medication history source reliability: Good  Adherence assessment: N/A Not Observed    Significant changes made to the medication list:  None      Additional medication history information:   None        Prior to Admission medications    Medication Sig Last Dose Taking? Auth Provider   apixaban ANTICOAGULANT (ELIQUIS) 5 MG tablet Take 5 mg by mouth 2 times daily  Yes Reported, Patient   aspirin 81 MG tablet Take 1 tablet by mouth daily.  at AM Yes Reported, Patient   Ferrous Fumarate 63 (20 Fe) MG TABS Take 65 mg by mouth daily   at AM Yes Reported, Patient   metoprolol tartrate (LOPRESSOR) 25 MG tablet Take 25 mg by mouth 2 times daily  Yes Reported, Patient   omeprazole (PRILOSEC) 20 MG DR capsule Take 20 mg by mouth daily as needed   at PRN Yes Reported, Patient   rosuvastatin (CRESTOR) 10 MG tablet Take 10 mg by mouth At Bedtime   at HS Yes Reported, Patient

## 2020-09-08 NOTE — TELEPHONE ENCOUNTER
[place] VASCULAR [facility]    I called Narcisa Harris about her Left CEA tomorrow.  She had no questions about her coming surgery.    Her COVID-19 testing is negative from 9/5/2020.    She is very anxious about going into the operating room due to her recent heart surgery.  I told her we could sedate her lightly prior to entering the room and will discuss this with the anesthesia staff.      Peter Mazariegos MD

## 2020-09-09 ENCOUNTER — HOSPITAL ENCOUNTER (INPATIENT)
Facility: CLINIC | Age: 80
LOS: 3 days | Discharge: HOME | DRG: 038 | End: 2020-09-12
Attending: SURGERY | Admitting: SURGERY
Payer: MEDICARE

## 2020-09-09 ENCOUNTER — APPOINTMENT (OUTPATIENT)
Dept: SURGERY | Facility: PHYSICIAN GROUP | Age: 80
End: 2020-09-09
Payer: MEDICARE

## 2020-09-09 ENCOUNTER — ANESTHESIA (OUTPATIENT)
Dept: SURGERY | Facility: CLINIC | Age: 80
DRG: 038 | End: 2020-09-09
Payer: MEDICARE

## 2020-09-09 ENCOUNTER — ANESTHESIA EVENT (OUTPATIENT)
Dept: SURGERY | Facility: CLINIC | Age: 80
DRG: 038 | End: 2020-09-09
Payer: MEDICARE

## 2020-09-09 DIAGNOSIS — I65.23 CAROTID STENOSIS, ASYMPTOMATIC, BILATERAL: ICD-10-CM

## 2020-09-09 PROBLEM — I65.29 CAROTID STENOSIS, ASYMPTOMATIC: Status: ACTIVE | Noted: 2020-09-09

## 2020-09-09 LAB
ANION GAP SERPL CALCULATED.3IONS-SCNC: 6 MMOL/L (ref 3–14)
BUN SERPL-MCNC: 19 MG/DL (ref 7–30)
CALCIUM SERPL-MCNC: 8.2 MG/DL (ref 8.5–10.1)
CHLORIDE SERPL-SCNC: 109 MMOL/L (ref 94–109)
CHOLEST SERPL-MCNC: 156 MG/DL
CO2 SERPL-SCNC: 24 MMOL/L (ref 20–32)
CREAT SERPL-MCNC: 1.05 MG/DL (ref 0.52–1.04)
GFR SERPL CREATININE-BSD FRML MDRD: 50 ML/MIN/{1.73_M2}
GLUCOSE SERPL-MCNC: 105 MG/DL (ref 70–99)
HBA1C MFR BLD: 5.5 % (ref 0–5.6)
HDLC SERPL-MCNC: 69 MG/DL
LDLC SERPL CALC-MCNC: 72 MG/DL
NONHDLC SERPL-MCNC: 87 MG/DL
PLATELET # BLD AUTO: 299 10E9/L (ref 150–450)
POTASSIUM SERPL-SCNC: 4.3 MMOL/L (ref 3.4–5.3)
SODIUM SERPL-SCNC: 139 MMOL/L (ref 133–144)
TRIGL SERPL-MCNC: 74 MG/DL

## 2020-09-09 PROCEDURE — 99223 1ST HOSP IP/OBS HIGH 75: CPT | Performed by: INTERNAL MEDICINE

## 2020-09-09 PROCEDURE — 95940 IONM IN OPERATNG ROOM 15 MIN: CPT | Performed by: SURGERY

## 2020-09-09 PROCEDURE — 93010 ELECTROCARDIOGRAM REPORT: CPT | Performed by: INTERNAL MEDICINE

## 2020-09-09 PROCEDURE — 80061 LIPID PANEL: CPT | Performed by: SURGERY

## 2020-09-09 PROCEDURE — 25000125 ZZHC RX 250: Performed by: ANESTHESIOLOGY

## 2020-09-09 PROCEDURE — 27210794 ZZH OR GENERAL SUPPLY STERILE: Performed by: SURGERY

## 2020-09-09 PROCEDURE — 37000009 ZZH ANESTHESIA TECHNICAL FEE, EACH ADDTL 15 MIN: Performed by: SURGERY

## 2020-09-09 PROCEDURE — 37000008 ZZH ANESTHESIA TECHNICAL FEE, 1ST 30 MIN: Performed by: SURGERY

## 2020-09-09 PROCEDURE — 25800030 ZZH RX IP 258 OP 636: Performed by: STUDENT IN AN ORGANIZED HEALTH CARE EDUCATION/TRAINING PROGRAM

## 2020-09-09 PROCEDURE — 25000125 ZZHC RX 250: Performed by: NURSE ANESTHETIST, CERTIFIED REGISTERED

## 2020-09-09 PROCEDURE — 25800030 ZZH RX IP 258 OP 636: Performed by: ANESTHESIOLOGY

## 2020-09-09 PROCEDURE — 25000128 H RX IP 250 OP 636: Performed by: NURSE ANESTHETIST, CERTIFIED REGISTERED

## 2020-09-09 PROCEDURE — 36000093 ZZH SURGERY LEVEL 4 1ST 30 MIN: Performed by: SURGERY

## 2020-09-09 PROCEDURE — 25800030 ZZH RX IP 258 OP 636: Performed by: SURGERY

## 2020-09-09 PROCEDURE — 36415 COLL VENOUS BLD VENIPUNCTURE: CPT | Performed by: INTERNAL MEDICINE

## 2020-09-09 PROCEDURE — 03CN0ZZ EXTIRPATION OF MATTER FROM LEFT EXTERNAL CAROTID ARTERY, OPEN APPROACH: ICD-10-PCS | Performed by: SURGERY

## 2020-09-09 PROCEDURE — 25000566 ZZH SEVOFLURANE, EA 15 MIN: Performed by: SURGERY

## 2020-09-09 PROCEDURE — 83036 HEMOGLOBIN GLYCOSYLATED A1C: CPT | Performed by: INTERNAL MEDICINE

## 2020-09-09 PROCEDURE — 80048 BASIC METABOLIC PNL TOTAL CA: CPT | Performed by: SURGERY

## 2020-09-09 PROCEDURE — 27211022 ZZHC OR IOM SUPPLIES OPNP: Performed by: SURGERY

## 2020-09-09 PROCEDURE — 25000132 ZZH RX MED GY IP 250 OP 250 PS 637: Mod: GY | Performed by: PHYSICIAN ASSISTANT

## 2020-09-09 PROCEDURE — 25000128 H RX IP 250 OP 636: Performed by: SURGERY

## 2020-09-09 PROCEDURE — 36415 COLL VENOUS BLD VENIPUNCTURE: CPT | Performed by: SURGERY

## 2020-09-09 PROCEDURE — 36000063 ZZH SURGERY LEVEL 4 EA 15 ADDTL MIN: Performed by: SURGERY

## 2020-09-09 PROCEDURE — 25000128 H RX IP 250 OP 636: Performed by: STUDENT IN AN ORGANIZED HEALTH CARE EDUCATION/TRAINING PROGRAM

## 2020-09-09 PROCEDURE — 03UL07Z SUPPLEMENT LEFT INTERNAL CAROTID ARTERY WITH AUTOLOGOUS TISSUE SUBSTITUTE, OPEN APPROACH: ICD-10-PCS | Performed by: SURGERY

## 2020-09-09 PROCEDURE — 03CL0ZZ EXTIRPATION OF MATTER FROM LEFT INTERNAL CAROTID ARTERY, OPEN APPROACH: ICD-10-PCS | Performed by: SURGERY

## 2020-09-09 PROCEDURE — 85049 AUTOMATED PLATELET COUNT: CPT | Performed by: SURGERY

## 2020-09-09 PROCEDURE — 25000132 ZZH RX MED GY IP 250 OP 250 PS 637: Mod: GY | Performed by: STUDENT IN AN ORGANIZED HEALTH CARE EDUCATION/TRAINING PROGRAM

## 2020-09-09 PROCEDURE — 25000125 ZZHC RX 250: Performed by: SURGERY

## 2020-09-09 PROCEDURE — 71000012 ZZH RECOVERY PHASE 1 LEVEL 1 FIRST HR: Performed by: SURGERY

## 2020-09-09 PROCEDURE — 25800030 ZZH RX IP 258 OP 636: Performed by: NURSE ANESTHETIST, CERTIFIED REGISTERED

## 2020-09-09 PROCEDURE — 40000171 ZZH STATISTIC PRE-PROCEDURE ASSESSMENT III: Performed by: SURGERY

## 2020-09-09 PROCEDURE — 93005 ELECTROCARDIOGRAM TRACING: CPT

## 2020-09-09 PROCEDURE — 12000000 ZZH R&B MED SURG/OB

## 2020-09-09 PROCEDURE — 85049 AUTOMATED PLATELET COUNT: CPT | Performed by: INTERNAL MEDICINE

## 2020-09-09 PROCEDURE — 25000128 H RX IP 250 OP 636: Performed by: ANESTHESIOLOGY

## 2020-09-09 RX ORDER — ONDANSETRON 4 MG/1
4 TABLET, ORALLY DISINTEGRATING ORAL EVERY 6 HOURS PRN
Status: DISCONTINUED | OUTPATIENT
Start: 2020-09-09 | End: 2020-09-12 | Stop reason: HOSPADM

## 2020-09-09 RX ORDER — LIDOCAINE 40 MG/G
CREAM TOPICAL
Status: DISCONTINUED | OUTPATIENT
Start: 2020-09-09 | End: 2020-09-12 | Stop reason: HOSPADM

## 2020-09-09 RX ORDER — OXYCODONE HYDROCHLORIDE 5 MG/1
5 TABLET ORAL
Status: DISCONTINUED | OUTPATIENT
Start: 2020-09-09 | End: 2020-09-12 | Stop reason: HOSPADM

## 2020-09-09 RX ORDER — FENTANYL CITRATE 50 UG/ML
50 INJECTION, SOLUTION INTRAMUSCULAR; INTRAVENOUS
Status: DISCONTINUED | OUTPATIENT
Start: 2020-09-09 | End: 2020-09-09

## 2020-09-09 RX ORDER — NEOSTIGMINE METHYLSULFATE 1 MG/ML
VIAL (ML) INJECTION PRN
Status: DISCONTINUED | OUTPATIENT
Start: 2020-09-09 | End: 2020-09-09

## 2020-09-09 RX ORDER — HEPARIN SODIUM 1000 [USP'U]/ML
INJECTION, SOLUTION INTRAVENOUS; SUBCUTANEOUS PRN
Status: DISCONTINUED | OUTPATIENT
Start: 2020-09-09 | End: 2020-09-09

## 2020-09-09 RX ORDER — NALOXONE HYDROCHLORIDE 0.4 MG/ML
.1-.4 INJECTION, SOLUTION INTRAMUSCULAR; INTRAVENOUS; SUBCUTANEOUS
Status: DISCONTINUED | OUTPATIENT
Start: 2020-09-09 | End: 2020-09-12 | Stop reason: HOSPADM

## 2020-09-09 RX ORDER — ROSUVASTATIN CALCIUM 10 MG/1
10 TABLET, COATED ORAL AT BEDTIME
Status: DISCONTINUED | OUTPATIENT
Start: 2020-09-09 | End: 2020-09-09

## 2020-09-09 RX ORDER — LIDOCAINE HYDROCHLORIDE 20 MG/ML
INJECTION, SOLUTION INFILTRATION; PERINEURAL PRN
Status: DISCONTINUED | OUTPATIENT
Start: 2020-09-09 | End: 2020-09-09

## 2020-09-09 RX ORDER — LABETALOL HYDROCHLORIDE 5 MG/ML
10 INJECTION, SOLUTION INTRAVENOUS EVERY 10 MIN PRN
Status: DISCONTINUED | OUTPATIENT
Start: 2020-09-09 | End: 2020-09-11

## 2020-09-09 RX ORDER — ACETAMINOPHEN 325 MG/1
975 TABLET ORAL EVERY 8 HOURS
Status: DISCONTINUED | OUTPATIENT
Start: 2020-09-09 | End: 2020-09-12 | Stop reason: HOSPADM

## 2020-09-09 RX ORDER — METOPROLOL TARTRATE 25 MG/1
25 TABLET, FILM COATED ORAL 2 TIMES DAILY
Status: DISCONTINUED | OUTPATIENT
Start: 2020-09-09 | End: 2020-09-12 | Stop reason: HOSPADM

## 2020-09-09 RX ORDER — ONDANSETRON 2 MG/ML
4 INJECTION INTRAMUSCULAR; INTRAVENOUS EVERY 6 HOURS PRN
Status: DISCONTINUED | OUTPATIENT
Start: 2020-09-09 | End: 2020-09-12 | Stop reason: HOSPADM

## 2020-09-09 RX ORDER — ACETAMINOPHEN 325 MG/1
650 TABLET ORAL EVERY 4 HOURS PRN
Status: DISCONTINUED | OUTPATIENT
Start: 2020-09-12 | End: 2020-09-12 | Stop reason: HOSPADM

## 2020-09-09 RX ORDER — ASPIRIN 81 MG/1
81 TABLET ORAL DAILY
Status: DISCONTINUED | OUTPATIENT
Start: 2020-09-10 | End: 2020-09-12 | Stop reason: HOSPADM

## 2020-09-09 RX ORDER — ROSUVASTATIN CALCIUM 20 MG/1
40 TABLET, COATED ORAL AT BEDTIME
Status: DISCONTINUED | OUTPATIENT
Start: 2020-09-09 | End: 2020-09-12 | Stop reason: HOSPADM

## 2020-09-09 RX ORDER — DEXAMETHASONE SODIUM PHOSPHATE 4 MG/ML
4 INJECTION, SOLUTION INTRA-ARTICULAR; INTRALESIONAL; INTRAMUSCULAR; INTRAVENOUS; SOFT TISSUE
Status: DISCONTINUED | OUTPATIENT
Start: 2020-09-09 | End: 2020-09-09 | Stop reason: HOSPADM

## 2020-09-09 RX ORDER — SODIUM CHLORIDE, SODIUM LACTATE, POTASSIUM CHLORIDE, CALCIUM CHLORIDE 600; 310; 30; 20 MG/100ML; MG/100ML; MG/100ML; MG/100ML
INJECTION, SOLUTION INTRAVENOUS CONTINUOUS
Status: DISCONTINUED | OUTPATIENT
Start: 2020-09-09 | End: 2020-09-09 | Stop reason: HOSPADM

## 2020-09-09 RX ORDER — LABETALOL HYDROCHLORIDE 5 MG/ML
10 INJECTION, SOLUTION INTRAVENOUS
Status: DISCONTINUED | OUTPATIENT
Start: 2020-09-09 | End: 2020-09-09 | Stop reason: HOSPADM

## 2020-09-09 RX ORDER — ONDANSETRON 2 MG/ML
INJECTION INTRAMUSCULAR; INTRAVENOUS PRN
Status: DISCONTINUED | OUTPATIENT
Start: 2020-09-09 | End: 2020-09-09

## 2020-09-09 RX ORDER — KETOROLAC TROMETHAMINE 30 MG/ML
INJECTION, SOLUTION INTRAMUSCULAR; INTRAVENOUS PRN
Status: DISCONTINUED | OUTPATIENT
Start: 2020-09-09 | End: 2020-09-09

## 2020-09-09 RX ORDER — DEXAMETHASONE SODIUM PHOSPHATE 4 MG/ML
INJECTION, SOLUTION INTRA-ARTICULAR; INTRALESIONAL; INTRAMUSCULAR; INTRAVENOUS; SOFT TISSUE PRN
Status: DISCONTINUED | OUTPATIENT
Start: 2020-09-09 | End: 2020-09-09

## 2020-09-09 RX ORDER — HEPARIN SODIUM 1000 [USP'U]/ML
INJECTION, SOLUTION INTRAVENOUS; SUBCUTANEOUS PRN
Status: DISCONTINUED | OUTPATIENT
Start: 2020-09-09 | End: 2020-09-09 | Stop reason: HOSPADM

## 2020-09-09 RX ORDER — EPHEDRINE SULFATE 50 MG/ML
INJECTION, SOLUTION INTRAMUSCULAR; INTRAVENOUS; SUBCUTANEOUS PRN
Status: DISCONTINUED | OUTPATIENT
Start: 2020-09-09 | End: 2020-09-09

## 2020-09-09 RX ORDER — ONDANSETRON 4 MG/1
4 TABLET, ORALLY DISINTEGRATING ORAL EVERY 30 MIN PRN
Status: DISCONTINUED | OUTPATIENT
Start: 2020-09-09 | End: 2020-09-09 | Stop reason: HOSPADM

## 2020-09-09 RX ORDER — HYDROMORPHONE HYDROCHLORIDE 1 MG/ML
.3-.5 INJECTION, SOLUTION INTRAMUSCULAR; INTRAVENOUS; SUBCUTANEOUS EVERY 5 MIN PRN
Status: DISCONTINUED | OUTPATIENT
Start: 2020-09-09 | End: 2020-09-09 | Stop reason: HOSPADM

## 2020-09-09 RX ORDER — BUPIVACAINE HYDROCHLORIDE 5 MG/ML
INJECTION, SOLUTION PERINEURAL PRN
Status: DISCONTINUED | OUTPATIENT
Start: 2020-09-09 | End: 2020-09-09 | Stop reason: HOSPADM

## 2020-09-09 RX ORDER — FENTANYL CITRATE 50 UG/ML
25-50 INJECTION, SOLUTION INTRAMUSCULAR; INTRAVENOUS
Status: DISCONTINUED | OUTPATIENT
Start: 2020-09-09 | End: 2020-09-09 | Stop reason: HOSPADM

## 2020-09-09 RX ORDER — CEFAZOLIN SODIUM 2 G/100ML
2 INJECTION, SOLUTION INTRAVENOUS
Status: COMPLETED | OUTPATIENT
Start: 2020-09-09 | End: 2020-09-09

## 2020-09-09 RX ORDER — NITROGLYCERIN 10 MG/100ML
INJECTION INTRAVENOUS PRN
Status: DISCONTINUED | OUTPATIENT
Start: 2020-09-09 | End: 2020-09-09

## 2020-09-09 RX ORDER — NALOXONE HYDROCHLORIDE 0.4 MG/ML
.1-.4 INJECTION, SOLUTION INTRAMUSCULAR; INTRAVENOUS; SUBCUTANEOUS
Status: DISCONTINUED | OUTPATIENT
Start: 2020-09-09 | End: 2020-09-09

## 2020-09-09 RX ORDER — FENTANYL CITRATE 50 UG/ML
INJECTION, SOLUTION INTRAMUSCULAR; INTRAVENOUS PRN
Status: DISCONTINUED | OUTPATIENT
Start: 2020-09-09 | End: 2020-09-09

## 2020-09-09 RX ORDER — GLYCOPYRROLATE 0.2 MG/ML
INJECTION, SOLUTION INTRAMUSCULAR; INTRAVENOUS PRN
Status: DISCONTINUED | OUTPATIENT
Start: 2020-09-09 | End: 2020-09-09

## 2020-09-09 RX ORDER — FENTANYL CITRATE 50 UG/ML
100 INJECTION, SOLUTION INTRAMUSCULAR; INTRAVENOUS ONCE
Status: DISCONTINUED | OUTPATIENT
Start: 2020-09-09 | End: 2020-09-09 | Stop reason: HOSPADM

## 2020-09-09 RX ORDER — ONDANSETRON 2 MG/ML
4 INJECTION INTRAMUSCULAR; INTRAVENOUS EVERY 30 MIN PRN
Status: DISCONTINUED | OUTPATIENT
Start: 2020-09-09 | End: 2020-09-09 | Stop reason: HOSPADM

## 2020-09-09 RX ORDER — HYDRALAZINE HYDROCHLORIDE 20 MG/ML
2.5-5 INJECTION INTRAMUSCULAR; INTRAVENOUS EVERY 10 MIN PRN
Status: DISCONTINUED | OUTPATIENT
Start: 2020-09-09 | End: 2020-09-09 | Stop reason: HOSPADM

## 2020-09-09 RX ORDER — ASPIRIN 600 MG/1
600 SUPPOSITORY RECTAL ONCE
Status: COMPLETED | OUTPATIENT
Start: 2020-09-09 | End: 2020-09-09

## 2020-09-09 RX ORDER — CLOPIDOGREL BISULFATE 75 MG/1
75 TABLET ORAL DAILY
Status: DISCONTINUED | OUTPATIENT
Start: 2020-09-09 | End: 2020-09-10

## 2020-09-09 RX ORDER — SODIUM CHLORIDE 9 MG/ML
INJECTION, SOLUTION INTRAVENOUS CONTINUOUS
Status: DISCONTINUED | OUTPATIENT
Start: 2020-09-09 | End: 2020-09-12 | Stop reason: HOSPADM

## 2020-09-09 RX ORDER — PROPOFOL 10 MG/ML
INJECTION, EMULSION INTRAVENOUS PRN
Status: DISCONTINUED | OUTPATIENT
Start: 2020-09-09 | End: 2020-09-09

## 2020-09-09 RX ORDER — LABETALOL HYDROCHLORIDE 5 MG/ML
INJECTION, SOLUTION INTRAVENOUS PRN
Status: DISCONTINUED | OUTPATIENT
Start: 2020-09-09 | End: 2020-09-09

## 2020-09-09 RX ADMIN — NITROGLYCERIN 20 MCG: 10 INJECTION INTRAVENOUS at 12:38

## 2020-09-09 RX ADMIN — DEXAMETHASONE SODIUM PHOSPHATE 4 MG: 4 INJECTION, SOLUTION INTRA-ARTICULAR; INTRALESIONAL; INTRAMUSCULAR; INTRAVENOUS; SOFT TISSUE at 12:15

## 2020-09-09 RX ADMIN — FENTANYL CITRATE 50 MCG: 50 INJECTION, SOLUTION INTRAMUSCULAR; INTRAVENOUS at 11:44

## 2020-09-09 RX ADMIN — SODIUM CHLORIDE: 9 INJECTION, SOLUTION INTRAVENOUS at 16:58

## 2020-09-09 RX ADMIN — ROCURONIUM BROMIDE 50 MG: 10 INJECTION INTRAVENOUS at 11:44

## 2020-09-09 RX ADMIN — KETOROLAC TROMETHAMINE 15 MG: 30 INJECTION, SOLUTION INTRAMUSCULAR at 13:03

## 2020-09-09 RX ADMIN — LABETALOL HYDROCHLORIDE 10 MG: 5 INJECTION INTRAVENOUS at 14:25

## 2020-09-09 RX ADMIN — SODIUM CHLORIDE, POTASSIUM CHLORIDE, SODIUM LACTATE AND CALCIUM CHLORIDE: 600; 310; 30; 20 INJECTION, SOLUTION INTRAVENOUS at 10:38

## 2020-09-09 RX ADMIN — Medication 5 MG: at 11:51

## 2020-09-09 RX ADMIN — HEPARIN SODIUM 5000 UNITS: 1000 INJECTION INTRAVENOUS; SUBCUTANEOUS at 12:34

## 2020-09-09 RX ADMIN — ONDANSETRON 4 MG: 2 INJECTION INTRAMUSCULAR; INTRAVENOUS at 13:57

## 2020-09-09 RX ADMIN — CEFAZOLIN SODIUM 1 G: 2 INJECTION, SOLUTION INTRAVENOUS at 13:49

## 2020-09-09 RX ADMIN — NITROGLYCERIN 20 MCG: 10 INJECTION INTRAVENOUS at 12:39

## 2020-09-09 RX ADMIN — Medication 5 MG: at 12:20

## 2020-09-09 RX ADMIN — DEXMEDETOMIDINE HYDROCHLORIDE 12 MCG: 100 INJECTION, SOLUTION INTRAVENOUS at 14:23

## 2020-09-09 RX ADMIN — MIDAZOLAM 2 MG: 1 INJECTION INTRAMUSCULAR; INTRAVENOUS at 11:37

## 2020-09-09 RX ADMIN — CLOPIDOGREL BISULFATE 75 MG: 75 TABLET, FILM COATED ORAL at 16:57

## 2020-09-09 RX ADMIN — NITROGLYCERIN 20 MCG: 10 INJECTION INTRAVENOUS at 13:05

## 2020-09-09 RX ADMIN — NITROGLYCERIN 20 MCG: 10 INJECTION INTRAVENOUS at 13:04

## 2020-09-09 RX ADMIN — LIDOCAINE HYDROCHLORIDE 2 ML: 10 INJECTION, SOLUTION EPIDURAL; INFILTRATION; INTRACAUDAL; PERINEURAL at 10:38

## 2020-09-09 RX ADMIN — DEXMEDETOMIDINE HYDROCHLORIDE 8 MCG: 100 INJECTION, SOLUTION INTRAVENOUS at 11:37

## 2020-09-09 RX ADMIN — CEFAZOLIN SODIUM 2 G: 2 INJECTION, SOLUTION INTRAVENOUS at 11:52

## 2020-09-09 RX ADMIN — MIDAZOLAM HYDROCHLORIDE 1 MG: 1 INJECTION, SOLUTION INTRAMUSCULAR; INTRAVENOUS at 10:45

## 2020-09-09 RX ADMIN — LIDOCAINE HYDROCHLORIDE 40 MG: 20 INJECTION, SOLUTION INFILTRATION; PERINEURAL at 14:05

## 2020-09-09 RX ADMIN — HYDRALAZINE HYDROCHLORIDE 5 MG: 20 INJECTION INTRAMUSCULAR; INTRAVENOUS at 14:47

## 2020-09-09 RX ADMIN — FENTANYL CITRATE 50 MCG: 50 INJECTION, SOLUTION INTRAMUSCULAR; INTRAVENOUS at 12:37

## 2020-09-09 RX ADMIN — LABETALOL HYDROCHLORIDE 10 MG: 5 INJECTION, SOLUTION INTRAVENOUS at 23:12

## 2020-09-09 RX ADMIN — NEOSTIGMINE METHYLSULFATE 5 MG: 1 INJECTION, SOLUTION INTRAVENOUS at 13:58

## 2020-09-09 RX ADMIN — NITROGLYCERIN 20 MCG: 10 INJECTION INTRAVENOUS at 14:18

## 2020-09-09 RX ADMIN — LABETALOL HYDROCHLORIDE 10 MG: 5 INJECTION, SOLUTION INTRAVENOUS at 22:16

## 2020-09-09 RX ADMIN — PROPOFOL 150 MG: 10 INJECTION, EMULSION INTRAVENOUS at 11:44

## 2020-09-09 RX ADMIN — LABETALOL HYDROCHLORIDE 10 MG: 5 INJECTION, SOLUTION INTRAVENOUS at 16:57

## 2020-09-09 RX ADMIN — ACETAMINOPHEN 975 MG: 325 TABLET, FILM COATED ORAL at 17:08

## 2020-09-09 RX ADMIN — NITROGLYCERIN 40 MCG: 10 INJECTION INTRAVENOUS at 14:22

## 2020-09-09 RX ADMIN — Medication 5 MG: at 11:59

## 2020-09-09 RX ADMIN — GLYCOPYRROLATE 1 MG: 0.2 INJECTION, SOLUTION INTRAMUSCULAR; INTRAVENOUS at 13:58

## 2020-09-09 RX ADMIN — FENTANYL CITRATE 50 MCG: 50 INJECTION, SOLUTION INTRAMUSCULAR; INTRAVENOUS at 10:44

## 2020-09-09 RX ADMIN — HYDRALAZINE HYDROCHLORIDE 5 MG: 20 INJECTION INTRAMUSCULAR; INTRAVENOUS at 14:55

## 2020-09-09 RX ADMIN — ROSUVASTATIN CALCIUM 40 MG: 20 TABLET, FILM COATED ORAL at 21:20

## 2020-09-09 RX ADMIN — DEXMEDETOMIDINE HYDROCHLORIDE 12 MCG: 100 INJECTION, SOLUTION INTRAVENOUS at 12:37

## 2020-09-09 RX ADMIN — PHENYLEPHRINE HYDROCHLORIDE 0.25 MCG/KG/MIN: 10 INJECTION INTRAVENOUS at 11:58

## 2020-09-09 RX ADMIN — SODIUM CHLORIDE, POTASSIUM CHLORIDE, SODIUM LACTATE AND CALCIUM CHLORIDE: 600; 310; 30; 20 INJECTION, SOLUTION INTRAVENOUS at 13:05

## 2020-09-09 RX ADMIN — ASPIRIN 600 MG: 600 SUPPOSITORY RECTAL at 14:46

## 2020-09-09 ASSESSMENT — ACTIVITIES OF DAILY LIVING (ADL)
ADLS_ACUITY_SCORE: 17
ADLS_ACUITY_SCORE: 16

## 2020-09-09 ASSESSMENT — MIFFLIN-ST. JEOR: SCORE: 1498.74

## 2020-09-09 ASSESSMENT — LIFESTYLE VARIABLES: TOBACCO_USE: 0

## 2020-09-09 ASSESSMENT — ENCOUNTER SYMPTOMS: DYSRHYTHMIAS: 1

## 2020-09-09 NOTE — ANESTHESIA POSTPROCEDURE EVALUATION
Patient: Narcisa Harris    Procedure(s):  LEFT CAROTID ENDARTERECTOMY WITH ELECTROENCEPHALOGRAM    Diagnosis:Carotid stenosis, asymptomatic, bilateral [I65.23]  Diagnosis Additional Information: No value filed.    Anesthesia Type:  General    Note:  Anesthesia Post Evaluation    Patient location during evaluation: PACU  Patient participation: Able to fully participate in evaluation  Level of consciousness: awake  Pain management: adequate  Airway patency: patent  Cardiovascular status: acceptable  Respiratory status: acceptable  Hydration status: acceptable  PONV: none             Last vitals:  Vitals:    09/09/20 0920 09/09/20 1420 09/09/20 1430   BP: (!) 198/71 (!) 212/116    Pulse: 53 64 57   Resp: 16  24   Temp: 36.6  C (97.9  F) 35.9  C (96.7  F)    SpO2: 96% 98% 98%         Electronically Signed By: Praneeth Valente MD  September 9, 2020  3:05 PM

## 2020-09-09 NOTE — PLAN OF CARE
Up to floor from PACU at 1600. VSS on room air. Tele Sinus Tono. A/Ox4, neurologically intact, no overt deficits noted. Incision on left neck with surgical dressing in place, CDI, slight numbness around incision. Pain controlled with scheduled tylenol, otherwise denying pain. Bedrest with bathroom privileges until 2100.  Low saturated fat diet, yola well good appetite, no nausea. BS faint, Flatus -. Due to void, purewik in place for incontinence. LS clear.

## 2020-09-09 NOTE — ANESTHESIA POSTPROCEDURE EVALUATION
Patient: Narcisa Harris    Procedure(s):  LEFT CAROTID ENDARTERECTOMY WITH ELECTROENCEPHALOGRAM    Diagnosis:Carotid stenosis, asymptomatic, bilateral [I65.23]  Diagnosis Additional Information: No value filed.    Anesthesia Type:  General    Note:  Anesthesia Post Evaluation    Patient location during evaluation: PACU  Patient participation: Able to fully participate in evaluation  Level of consciousness: awake  Pain management: adequate  Airway patency: patent  Cardiovascular status: acceptable  Respiratory status: acceptable  Hydration status: acceptable  PONV: none             Last vitals:  Vitals:    09/09/20 1520 09/09/20 1530 09/09/20 1540   BP: (!) 173/64 (!) 165/66 (!) 162/58   Pulse: 54 53 52   Resp: 10 26 16   Temp:      SpO2: 96% 95% 95%         Electronically Signed By: Praneeth Valente MD  September 9, 2020  4:09 PM

## 2020-09-09 NOTE — PROGRESS NOTES
Updated Dr. Valente that 's/50's after 10 of Hydralazine. With preop 's no need to treat further.

## 2020-09-09 NOTE — BRIEF OP NOTE
[facility]    Brief Operative Note    Pre-operative diagnosis: Carotid stenosis, asymptomatic, bilateral [I65.23]  Post-operative diagnosis Same as pre-operative diagnosis    Procedure: Procedure(s):  LEFT CAROTID ENDARTERECTOMY WITH ELECTROENCEPHALOGRAM  Surgeon: Surgeon(s) and Role:     * Peter Mazariegos MD - Primary     * Jaylon Lutz MD - Resident - Assisting  Anesthesia: General   Estimated blood loss: Less than 50 ml  Drains: None  Specimens:   ID Type Source Tests Collected by Time Destination   1 : CAROTID PLAQUE Other (specify in comments) Artery, Carotid OR DOCUMENTATION ONLY Peter Mazariegos MD 9/9/2020 12:55 PM      Findings:   atherosclerotic plaque at carotid bulb, shunted, no EEG changes.  Complications: None.  Implants: * No implants in log *

## 2020-09-09 NOTE — ANESTHESIA PROCEDURE NOTES
ARTERIAL LINE PROCEDURE NOTE:  Staff -   Anesthesiologist:  Sahara Chacon MD      Performed By: anesthesiologist         Pre-Procedure  Performed by Sahara Chacon MD  Location: pre-op      Pre-Anesthestic Checklist: patient identified, IV checked, risks and benefits discussed, informed consent, monitors and equipment checked and pre-op evaluation    Timeout  Correct Patient: Yes   Correct Procedure: Yes   Correct Site: Yes     Correct Position: Yes     .   Procedure Documentation  Procedure: arterial line    Supine  Insertion Site:right.Skin infiltrated with 1 mL of 1% lidocaine. Injection technique: Seldinger Technique and Jaime's test completed  .  .  Patient Prep/Sterile Barriers; all elements of maximal sterile barrier technique followed, mask, hat, sterile gown, sterile gloves, draped, hand hygiene, chlorhexidine gluconate and isopropyl alcohol    Assessment/Narrative    Catheter: 20 gauge, 12 cm     Secured by other  Tegaderm dressing used.    Arterial waveform: Yes IBP within 10% of NIBP: Yes

## 2020-09-09 NOTE — CONSULTS
[facility]    Vascular Medicine Consultation     Attestation: I have examined the patient independently of Autumn Daugherty PA-C and agree with the examination and plan as delineated below.    Braeden Simon MD      Date of Admission:  9/9/2020  Date of Consult (When I saw the patient): 09/09/20    Assessment & Plan   1. Asymptomatic progressive high grade left carotid artery stenosis undergoing a left carotid endarterectomy 9/9/20    Post-operative cares per Dr. Mazariegos / Vascular Surgery. She was on Eliquis for atrial fibrillation and aspirin as an outpatient. Resume when able.     2. Hyperlipidemia    Her lipid panel this admission is significant for an LDL of 72 while on Crestor 10 mg daily. Given her CAD and carotid disease, she should optially be treated more aggressively to an LDL of less than 70. Therefore, her Crestor dose will be increased to 40 mg daily. She should then have a lipid panel repeated in 3 months to ascertain whether or not she is at goal on this regimen.     3. CAD s/p CABG 6/2020    She has recovered well from this. She is presently asymptomatic. Continue medical management.     4. Atrial fibrillation    She is on chronic anticoagulation with Eliquis and rate controlled with Metoprolol. Continue the same post-operatively.     5. Hypertension    Continue prior to admission metoprolol and monitor blood pressure closely.     6. GERD    Currently controlled with Omeprazole.       Reason for Consult   Reason for consult: Asked by Dr. Mazariegos to evaluate vascular risk factors and assist with medical management in this 80 year old female non-smoker with a history of hypertension, hyperlipidemia, CAD s/p CABG, atrial fibrillation and GERD now presenting for a left carotid endarterectomy for progressive high grade asymptomatic left carotid artery stenosis.     Primary Care Physician   KIRBY JANSEN      History of Present Illness   Narcisa Harris is a 80 year old non-smoking  female with a history of atrial fibrillation on chronic anticoagulation with Eliquis, hypertension, hyperlipidemia, and GERD who has been followed for many years for bilateral carotid artery stenosis originally found on a life screen exam. She has had multiple complaints over the years for some headaches, hearing a bruit when she lays down, and occasional lightheadedness. She has had no focal neurological symptoms and this carotid stenosis has been asymptomatic. She underwent a carotid duplex on 5/20/20. This showed elevated velocities in her carotid arteries (236 cm/s on the right and 240 cm/s on the left. CTA 5/27/20 showed 77% stenosis of the left carotid artery. She was advised to undergo a left carotid endarterectomy.     However, as part of her pre-operative work-up she underwent a stress test, concerning for ischemia. She ultimately underwent a CABG on 6/11/20. She has now recovered from this and presents for her carotid endarterectomy.         Past Medical History   Past Medical History:   Diagnosis Date     Arthritis      Atrial fibrillation (H)      Bilateral carotid artery stenosis      Depression with anxiety      Edema of lower extremity left      Gastroesophageal reflux disease      Gastroesophageal reflux disease with esophagitis      Hypertension      Mixed hyperlipidemia      Obese      Pes anserinus bursitis      Unspecified cataract        Past Surgical History   Past Surgical History:   Procedure Laterality Date     ABDOMEN SURGERY      exp lap     CARDIAC SURGERY      CABG     EYE SURGERY  2019    R cataract extraction     ORTHOPEDIC SURGERY      L TKA       Prior to Admission Medications   Prior to Admission Medications   Prescriptions Last Dose Informant Patient Reported? Taking?   Ferrous Fumarate 63 (20 Fe) MG TABS 9/9/2020 at AM Self Yes Yes   Sig: Take 65 mg by mouth daily    apixaban ANTICOAGULANT (ELIQUIS) 5 MG tablet 9/6/2020 Self Yes Yes   Sig: Take 5 mg by mouth 2 times daily    aspirin 81 MG tablet 9/8/2020 at AM Self Yes Yes   Sig: Take 1 tablet by mouth daily.   metoprolol tartrate (LOPRESSOR) 25 MG tablet 9/9/2020 at Unknown time Self Yes Yes   Sig: Take 25 mg by mouth 2 times daily   omeprazole (PRILOSEC) 20 MG DR capsule 9/9/2020 at PRN Self Yes Yes   Sig: Take 20 mg by mouth daily as needed    rosuvastatin (CRESTOR) 10 MG tablet 9/8/2020 at HS Self Yes Yes   Sig: Take 10 mg by mouth At Bedtime       Facility-Administered Medications: None     Allergies   Allergies   Allergen Reactions     Atorvastatin Muscle Pain (Myalgia)     Lisinopril Cough       Social History   Narcisa Harris  reports that she has never smoked. She has never used smokeless tobacco. She reports that she does not drink alcohol or use drugs.    Family History   Family History   Problem Relation Age of Onset     Cancer Father        Review of Systems   The 10 point Review of Systems is negative other than noted in the HPI or here.     Physical Exam   Temp: 97.9  F (36.6  C) Temp src: Temporal BP: (!) 198/71 Pulse: 53   Resp: 16 SpO2: 96 % O2 Device: None (Room air)    Vital Signs with Ranges  Temp:  [97.9  F (36.6  C)] 97.9  F (36.6  C)  Pulse:  [53] 53  Resp:  [16] 16  BP: (198)/(71) 198/71  SpO2:  [96 %] 96 %  236 lbs 0 oz    Constitutional: awake, alert, cooperative, no apparent distress, and appears stated age  Eyes: Lids and lashes normal, pupils equal, round and reactive to light, extra ocular muscles intact, sclera clear, conjunctiva normal  ENT: normocepalic, without obvious abnormality, oropharynx pink and moist  Hematologic / Lymphatic: no lymphadenopathy  Respiratory: No increased work of breathing, good air exchange, clear to auscultation bilaterally, no crackles or wheezing  Cardiovascular: regular rate and rhythm, normal S1 and S2 and no murmur noted  GI: Normal bowel sounds, soft, non-distended, non-tender  Skin: no redness, warmth, or swelling, no rashes and surgical site on sternum is healing.    Musculoskeletal: There is no redness, warmth, or swelling of the joints.  Full range of motion noted.  Motor strength is 5 out of 5 all extremities bilaterally.  Tone is normal.  Neurologic: Awake, alert, oriented to name, place and time.  Cranial nerves II-XII are grossly intact.  Motor is 5 out of 5 bilaterally.    Neuropsychiatric:  Normal affect, memory, insight.      Data   Most Recent 3 CBC's:No lab results found.  Most Recent 3 BMP's:  Recent Labs   Lab Test 09/09/20 0912 08/01/18     --    POTASSIUM 4.3 3.8   CHLORIDE 109  --    CO2 24  --    BUN 19  --    CR 1.05* 0.81   ANIONGAP 6  --    GENEVIEVE 8.2*  --    * 96     Most Recent 3 INR's:No lab results found.  Most Recent Cholesterol Panel:  Recent Labs   Lab Test 09/09/20 0912   CHOL 156   LDL 72   HDL 69   TRIG 74     Most Recent Hemoglobin A1c:No lab results found.

## 2020-09-09 NOTE — ANESTHESIA CARE TRANSFER NOTE
Patient: Narcisa Harris    Procedure(s):  LEFT CAROTID ENDARTERECTOMY WITH ELECTROENCEPHALOGRAM    Diagnosis: Carotid stenosis, asymptomatic, bilateral [I65.23]  Diagnosis Additional Information: No value filed.    Anesthesia Type:   General     Note:  Airway :Face Mask  Patient transferred to:PACU  Comments: Neuromuscular blockade reversed after TOF 4/4, spontaneous respirations, adequate tidal volumes, followed commands to voice, oropharynx suctioned with soft flexible catheter, extubated atraumatically, extubated with suction, airway patent after extubation.  Oxygen via facemask at 8 liters per minute to PACU. Oxygen tubing connected to wall O2 in PACU, SpO2, NiBP, and EKG monitors and alarms on and functioning, Jose Hugger warmer connected to patient gown, report on patient's clinical status given to PACU RN, RN questions answered.   Handoff Report: Identifed the Patient, Identified the Reponsible Provider, Reviewed the pertinent medical history, Discussed the surgical course, Reviewed Intra-OP anesthesia mangement and issues during anesthesia, Set expectations for post-procedure period and Allowed opportunity for questions and acknowledgement of understanding      Vitals: (Last set prior to Anesthesia Care Transfer)    CRNA VITALS  9/9/2020 1347 - 9/9/2020 1423      9/9/2020             Pulse:  65    ART BP:  200/71    ART Mean:  124    SpO2:  99 %    Resp Rate (observed):  13                Electronically Signed By: HAIR Burnham CRNA  September 9, 2020  2:23 PM

## 2020-09-09 NOTE — PROGRESS NOTES
HOSPITALIST CONSULT CHART CHECK:    Hospitalist service was consulted for after hours cross coverage only. We will peripherally follow and chart check.     - For medical concerns during business hours, call the [facility] Vascular [facility] at [phone] to have the rounding/on call Vascular Medicine (NOT VASCULAR SURGERY) MD paged.    - After business hours, for medical concerns on this patient, please page Hospitalist staff.    - For vascular surgical questions, please page the appropriate surgeon (primary vascular surgeon or on call vascular surgeon) based upon the time of day.       Deepti Schumacher PA-C

## 2020-09-09 NOTE — ANESTHESIA PREPROCEDURE EVALUATION
Anesthesia Pre-Procedure Evaluation    Patient: Narcisa Harris   MRN: [id number] : 1940          Preoperative Diagnosis: Carotid stenosis, asymptomatic, bilateral [I65.23]    Procedure(s):  LEFT CAROTID ENDARTERECTOMY WITH ELECTROENCEPHALOGRAM    Past Medical History:   Diagnosis Date     Arthritis      Atrial fibrillation (H)      Bilateral carotid artery stenosis      Depression with anxiety      Edema of lower extremity left      Gastroesophageal reflux disease      Gastroesophageal reflux disease with esophagitis      Hypertension      Mixed hyperlipidemia      Obese      Pes anserinus bursitis      Unspecified cataract      Past Surgical History:   Procedure Laterality Date     ABDOMEN SURGERY      exp lap     CARDIAC SURGERY      CABG     EYE SURGERY  2019    R cataract extraction     ORTHOPEDIC SURGERY      L TKA       Anesthesia Evaluation     . Pt has had prior anesthetic.     No history of anesthetic complications          ROS/MED HX    ENT/Pulmonary:  - neg pulmonary ROS    (-) tobacco use, asthma and sleep apnea   Neurologic:  - neg neurologic ROS     Cardiovascular:     (+) Dyslipidemia, hypertension-Peripheral Vascular Disease-- Carotid Stenosis, CAD, angina-CABG-. Taking blood thinners : . . . :. dysrhythmias a-fib, .       METS/Exercise Tolerance:     Hematologic:         Musculoskeletal:         GI/Hepatic:     (+) GERD Asymptomatic on medication,       Renal/Genitourinary:  - ROS Renal section negative       Endo:     (+) Obesity, .      Psychiatric:     (+) psychiatric history anxiety and depression      Infectious Disease:         Malignancy:         Other:                          Physical Exam  Normal systems: pulmonary    Airway   Mallampati: II  TM distance: >3 FB    Dental   (+) caps    Cardiovascular   Rhythm and rate: regular      Pulmonary             Lab Results   Component Value Date    POTASSIUM 3.8 2018    CR 0.81 2018    GLC 96 2018       Preop Vitals  BP  "Readings from Last 3 Encounters:   08/18/20 (!) 144/54   05/28/20 (!) 154/82   05/30/19 143/75    Pulse Readings from Last 3 Encounters:   05/28/20 92   05/30/19 77   10/25/18 72      Resp Readings from Last 3 Encounters:   09/27/13 12    SpO2 Readings from Last 3 Encounters:   09/27/13 96%      Temp Readings from Last 1 Encounters:   No data found for Temp    Ht Readings from Last 1 Encounters:   10/25/18 1.588 m (5' 2.5\")      Wt Readings from Last 1 Encounters:   10/25/18 95.3 kg (210 lb)    Estimated body mass index is 37.8 kg/m  as calculated from the following:    Height as of 10/25/18: 1.588 m (5' 2.5\").    Weight as of 10/25/18: 95.3 kg (210 lb).       Anesthesia Plan      History & Physical Review  History and physical reviewed and following examination; no interval change.    ASA Status:  3 .    NPO Status:  > 8 hours    Plan for General with Intravenous and Propofol induction. Maintenance will be Balanced.    PONV prophylaxis:  Ondansetron (or other 5HT-3)  Additional equipment: 2nd IV, Arterial Line and Videolaryngoscope        Postoperative Care  Postoperative pain management:  Multi-modal analgesia.      Consents  Anesthetic plan, risks, benefits and alternatives discussed with:  Patient..                 Sahara Chacon MD  "

## 2020-09-10 LAB
ANION GAP SERPL CALCULATED.3IONS-SCNC: 5 MMOL/L (ref 3–14)
BUN SERPL-MCNC: 21 MG/DL (ref 7–30)
CALCIUM SERPL-MCNC: 8 MG/DL (ref 8.5–10.1)
CHLORIDE SERPL-SCNC: 111 MMOL/L (ref 94–109)
CO2 SERPL-SCNC: 24 MMOL/L (ref 20–32)
CREAT SERPL-MCNC: 1.15 MG/DL (ref 0.52–1.04)
ERYTHROCYTE [DISTWIDTH] IN BLOOD BY AUTOMATED COUNT: 14.4 % (ref 10–15)
GFR SERPL CREATININE-BSD FRML MDRD: 45 ML/MIN/{1.73_M2}
GLUCOSE SERPL-MCNC: 132 MG/DL (ref 70–99)
HCT VFR BLD AUTO: 35.3 % (ref 35–47)
HGB BLD-MCNC: 11 G/DL (ref 11.7–15.7)
MCH RBC QN AUTO: 28.4 PG (ref 26.5–33)
MCHC RBC AUTO-ENTMCNC: 31.2 G/DL (ref 31.5–36.5)
MCV RBC AUTO: 91 FL (ref 78–100)
PLATELET # BLD AUTO: 282 10E9/L (ref 150–450)
POTASSIUM SERPL-SCNC: 4.4 MMOL/L (ref 3.4–5.3)
RBC # BLD AUTO: 3.88 10E12/L (ref 3.8–5.2)
SODIUM SERPL-SCNC: 140 MMOL/L (ref 133–144)
WBC # BLD AUTO: 13.3 10E9/L (ref 4–11)

## 2020-09-10 PROCEDURE — 99233 SBSQ HOSP IP/OBS HIGH 50: CPT | Performed by: INTERNAL MEDICINE

## 2020-09-10 PROCEDURE — 25000132 ZZH RX MED GY IP 250 OP 250 PS 637: Mod: GY | Performed by: INTERNAL MEDICINE

## 2020-09-10 PROCEDURE — 25000132 ZZH RX MED GY IP 250 OP 250 PS 637: Mod: GY | Performed by: HOSPITALIST

## 2020-09-10 PROCEDURE — 25000132 ZZH RX MED GY IP 250 OP 250 PS 637: Mod: GY | Performed by: PHYSICIAN ASSISTANT

## 2020-09-10 PROCEDURE — 25000132 ZZH RX MED GY IP 250 OP 250 PS 637: Mod: GY | Performed by: STUDENT IN AN ORGANIZED HEALTH CARE EDUCATION/TRAINING PROGRAM

## 2020-09-10 PROCEDURE — 80048 BASIC METABOLIC PNL TOTAL CA: CPT | Performed by: STUDENT IN AN ORGANIZED HEALTH CARE EDUCATION/TRAINING PROGRAM

## 2020-09-10 PROCEDURE — 85027 COMPLETE CBC AUTOMATED: CPT | Performed by: STUDENT IN AN ORGANIZED HEALTH CARE EDUCATION/TRAINING PROGRAM

## 2020-09-10 PROCEDURE — 12000000 ZZH R&B MED SURG/OB

## 2020-09-10 PROCEDURE — 36415 COLL VENOUS BLD VENIPUNCTURE: CPT | Performed by: STUDENT IN AN ORGANIZED HEALTH CARE EDUCATION/TRAINING PROGRAM

## 2020-09-10 RX ORDER — LISINOPRIL 20 MG/1
20 TABLET ORAL DAILY
Status: DISCONTINUED | OUTPATIENT
Start: 2020-09-10 | End: 2020-09-12 | Stop reason: HOSPADM

## 2020-09-10 RX ORDER — POLYETHYLENE GLYCOL 3350 17 G/17G
17 POWDER, FOR SOLUTION ORAL DAILY PRN
Status: DISCONTINUED | OUTPATIENT
Start: 2020-09-10 | End: 2020-09-12 | Stop reason: HOSPADM

## 2020-09-10 RX ADMIN — LISINOPRIL 20 MG: 20 TABLET ORAL at 13:32

## 2020-09-10 RX ADMIN — ASPIRIN 81 MG: 81 TABLET, COATED ORAL at 08:58

## 2020-09-10 RX ADMIN — APIXABAN 5 MG: 5 TABLET, FILM COATED ORAL at 08:58

## 2020-09-10 RX ADMIN — ACETAMINOPHEN 975 MG: 325 TABLET, FILM COATED ORAL at 17:47

## 2020-09-10 RX ADMIN — ACETAMINOPHEN 975 MG: 325 TABLET, FILM COATED ORAL at 08:58

## 2020-09-10 RX ADMIN — POLYETHYLENE GLYCOL 3350 17 G: 17 POWDER, FOR SOLUTION ORAL at 13:32

## 2020-09-10 RX ADMIN — APIXABAN 5 MG: 5 TABLET, FILM COATED ORAL at 22:34

## 2020-09-10 RX ADMIN — METOPROLOL TARTRATE 25 MG: 25 TABLET, FILM COATED ORAL at 19:59

## 2020-09-10 RX ADMIN — ROSUVASTATIN CALCIUM 40 MG: 20 TABLET, FILM COATED ORAL at 22:34

## 2020-09-10 ASSESSMENT — ACTIVITIES OF DAILY LIVING (ADL)
ADLS_ACUITY_SCORE: 20
ADLS_ACUITY_SCORE: 16
ADLS_ACUITY_SCORE: 20

## 2020-09-10 ASSESSMENT — MIFFLIN-ST. JEOR: SCORE: 1510.08

## 2020-09-10 NOTE — PROGRESS NOTES
SPIRITUAL HEALTH SERVICES Progress Note  [facility]    Visit with pt, per request in chart.  Pt had a procedure yesterday, and anticipates discharge to home tomorrow.  She said that she is Orthodox, and wondered why a particular Orthodox cable television isn't available here at the hospital.  Pt said she was raised Orthodox, and raised her children in the Congregation, too.  She now primarily watches Congregation on TV, but hopes to someday find a Alevism where she can Rastafarian in person again.  She declined my offer to contact Fr. Bradley, but requested prayer from me.  Engaged in conversation, life review, and provided support and prayer.   team available if additional needs arise.                                                                                                                                                 Silvia Shepherd M.A.  Staff   Pager [phone]  Phone [phone]

## 2020-09-10 NOTE — PROGRESS NOTES
HOSPITALIST CONSULT CHART CHECK:    Hospitalist service was consulted for after hours cross coverage only. We will peripherally follow and chart check.     - For medical concerns during business hours, call the [facility] Vascular [facility] at [phone] to have the rounding/on call Vascular Medicine (NOT VASCULAR SURGERY) MD paged.    - After business hours, for medical concerns on this patient, please page Hospitalist staff.    - For vascular surgical questions, please page the appropriate surgeon (primary vascular surgeon or on call vascular surgeon) based upon the time of day.     Adeola Do [facility]  Hospitalist - [place] [facility]  [phone]     Text Page

## 2020-09-10 NOTE — PLAN OF CARE
A/O. VSS ex for bradycardic, and HTN, IV labetalol given x2. Tele SB, heart rate anywhere from 45-55bpm. LS clear. +BS, -flatus, -bm. Voiding, can be incontinent. Dressing to incision cdi. Ecchymotic below incision, site soft. Neuros intact. CMS intact. Denies pain. Tolerating diet. Up with assist x1.

## 2020-09-10 NOTE — PROGRESS NOTES
Vascular Surgery Progress Note:  POD#1  Left CEA    S: Very comfortable through the night.    O: Running higher blood pressure and required labetalol last evening.  Vitals:  BP  Min: 133/43  Max: 212/116  Temp  Av.3  F (36.3  C)  Min: 96.7  F (35.9  C)  Max: 97.9  F (36.6  C)  Pulse  Av.4  Min: 46  Max: 70  I/O last 3 completed shifts:  In: 2959 [P.O.:600; I.V.:2359]  Out: 725 [Urine:725]    Physical Exam: Alert and appropriate.  Sitting up.                        Conversant.       Wd=A with very mild ecchymosis.  No swelling.      Cranial nerve XII and neuro normal.    Assessment/Plan: #1.  Doing very well following left CEA for high-grade calcified stenosis.  Given aspirin and Plavix postoperatively.  On chronic Eliquis and this will be restarted with discontinuation of Plavix.           #2.  Hypertension.  Very high perioperatively.  Better today.  Will need to monitor.  Patient lives alone and with a high blood pressure will keep him on hospital additional day.    Wm. Yair MD

## 2020-09-10 NOTE — PROGRESS NOTES
Spoke with Dr. Mazariegos on phone regarding patient complaint of feeling shaky, with warm spot on head, this happening for the second time today and being a new sensation for her. No new orders at this time, just monitor patient and give scheduled metoprolol this evening.

## 2020-09-10 NOTE — PROVIDER NOTIFICATION
MD Notification    Notified Person: MD    Notified Person Name: Dr. Ortiz    Notification Date/Time: 9/9/2020 at 7:58pm    Notification Interaction: Text paged    Purpose of Notification: Pt has scheduled metoprolol due with no hold parameters. Heart rate currently sitting between 47-55bpm. Please advise.    Orders Received: See new orders

## 2020-09-10 NOTE — PROGRESS NOTES
"VASCULAR SURGERY PROGRESS NOTE    Subjective:  Doing well. No dizziness or headache. No numbness or weakness in her extremities. Her throat and mouth are dry. She lives alone and would feel more comfortable staying 1 more day. BP was elevated yesterday evening and required 2 doses of labetalol. BP improved this morning. Bradycardia yesterday as well also improved this morning.     Objective:    Intake/Output Summary (Last 24 hours) at 9/10/2020 0725  Last data filed at 9/10/2020 0553  Gross per 24 hour   Intake 2959 ml   Output 725 ml   Net 2234 ml     Labs:  ROUTINE IP LABS (Last four results)  BMP  Recent Labs   Lab 09/09/20  0912      POTASSIUM 4.3   CHLORIDE 109   GENEVIEVE 8.2*   CO2 24   BUN 19   CR 1.05*   *     CBC  Recent Labs   Lab 09/09/20  1049        INRNo lab results found in last 7 days.  PHYSICAL EXAM:  BP (!) 148/76   Pulse 70   Temp 97.2  F (36.2  C)   Resp 13   Ht 1.575 m (5' 2\")   Wt 108.2 kg (238 lb 8 oz)   LMP  (LMP Unknown)   SpO2 97%   BMI 43.62 kg/m    General: The patient is alert and oriented. Appropriate. No acute distress  Psych: pleasant affect, answers questions appropriately  Skin: Color appropriate for race, warm, dry.  Neck: left neck incision with steri-strips intact, no hematoma or drainage, soft   Respiratory: The patient does not require supplemental oxygen. Breathing unlabored  GI:  Abdomen soft, nontender to light palpation.  Neuro: 5/5 strength in bilateral upper and lower extremities, CN II-XII grossly intact        ASSESSMENT:  POD#1 from left CEA for asymptomatic high grade stenosis. Doing well.       PLAN:  -plan to discharge tomorrow   -BP goal 100 to 160   -regular diet   -discontinue plavix and restart PTA Abixiban   -continue baby ASA   -continue PTA metoprolol   -continue crestor     Jaylon Lutz MD   General Surgery Resident, PGY-4   Pager: [phone]     "

## 2020-09-10 NOTE — PROGRESS NOTES
[facility]  Vascular Medicine Progress Note          Assessment and Plan:     1. Asymptomatic progressive high grade left carotid artery stenosis S/P Lt CEA 9/9/20     -Doing well presently postop. Htn yesterday evening requiring labetalol. See below.    -Keep in house for close BP monitoring.  -Stopped DAP. Resume Eliquis (AF)     2. Hyperlipidemia     -LDL of 72 while on Crestor 10 mg daily. Increase Crestor to 40 mg daily. She should then have a lipid panel repeated in 3 months to ascertain whether or not she is at goal on this regimen.      3. CAD s/p CABG 6/2020     She has recovered well from this. She is presently asymptomatic. Continue medical management.      4. Atrial fibrillation     She is on chronic anticoagulation with Eliquis and rate controlled with Metoprolol. Continue the same post-operatively.      5. Hypertension     -Continue prior to admission metoprolol.   -Add lisinopril, monitor BP. Monitor Cr. Discharge home tomorrow if BP better controlled.      6. GERD     Currently controlled with Omeprazole.                    Interval History:   doing well; no cp, sob, n/v/d, or abd pain.              Review of Systems:   The 10 point Review of Systems is negative other than noted in the HPI             Medications:       acetaminophen  975 mg Oral Q8H     apixaban ANTICOAGULANT  5 mg Oral BID     aspirin  81 mg Oral Daily     metoprolol tartrate  25 mg Oral BID     rosuvastatin  40 mg Oral At Bedtime     sodium chloride (PF)  3 mL Intracatheter Q8H                  Physical Exam:     Patient Vitals for the past 24 hrs:   BP Temp Temp src Pulse Resp SpO2 Weight   09/10/20 0751 (!) 148/52 97.9  F (36.6  C) Oral 57 16 98 % --   09/10/20 0600 (!) 148/76 -- -- 70 13 97 % --   09/10/20 0547 -- -- -- -- -- -- 108.2 kg (238 lb 8 oz)   09/10/20 0500 137/51 -- -- (!) 49 11 92 % --   09/10/20 0400 (!) 152/54 -- -- 53 12 94 % --   09/10/20 0300 (!) 150/50 -- -- 59 15 92 % --   09/10/20 0200 (!)  147/67 -- -- 53 18 96 % --   09/10/20 0110 -- -- -- 51 13 93 % --   09/10/20 0100 133/43 -- -- 53 13 94 % --   09/10/20 0000 (!) 152/51 -- -- 52 12 94 % --   09/09/20 2332 138/46 -- -- 55 14 97 % --   09/09/20 2300 (!) 196/94 -- -- (!) 47 18 96 % --   09/09/20 2212 (!) 195/75 -- -- 52 11 92 % --   09/09/20 2200 (!) 190/100 -- -- 51 12 98 % --   09/09/20 2100 (!) 167/63 -- -- (!) 46 15 97 % --   09/09/20 2000 (!) 155/53 -- -- 50 15 96 % --   09/09/20 1900 (!) 163/62 -- -- 52 18 96 % --   09/09/20 1800 (!) 173/70 -- -- 56 14 94 % --   09/09/20 1730 (!) 169/71 -- -- 52 15 94 % --   09/09/20 1715 (!) 170/60 -- -- -- -- -- --   09/09/20 1700 (!) 187/78 -- -- 51 13 98 % --   09/09/20 1645 (!) 192/94 -- -- 52 11 99 % --   09/09/20 1630 (!) 182/67 -- -- (!) 49 14 100 % --   09/09/20 1615 (!) 184/61 -- -- -- -- 100 % --   09/09/20 1600 (!) 172/61 -- -- -- -- -- --   09/09/20 1540 (!) 162/58 -- -- 52 16 95 % --   09/09/20 1530 (!) 165/66 -- -- 53 26 95 % --   09/09/20 1520 (!) 173/64 -- -- 54 10 96 % --   09/09/20 1510 (!) 168/56 -- -- 54 11 96 % --   09/09/20 1500 -- 97.2  F (36.2  C) -- 54 10 95 % --   09/09/20 1450 -- -- -- 56 10 95 % --   09/09/20 1440 (!) 196/81 -- -- 53 (!) 0 99 % --   09/09/20 1430 (!) 196/88 -- -- 57 24 98 % --   09/09/20 1420 (!) 212/116 96.7  F (35.9  C) -- 64 -- 98 % --     Wt Readings from Last 4 Encounters:   09/10/20 108.2 kg (238 lb 8 oz)   10/25/18 95.3 kg (210 lb)   09/27/13 103.2 kg (227 lb 8 oz)   06/21/13 105.2 kg (232 lb)       Intake/Output Summary (Last 24 hours) at 9/10/2020 1108  Last data filed at 9/10/2020 0900  Gross per 24 hour   Intake 3319 ml   Output 725 ml   Net 2594 ml     Constitutional: normal  Eyes: normal  ENT: normal  Neck: Supple, symmetrical, trachea midline, no adenopathy, thyroid symmetric, not enlarged and no tenderness, skin normal. Minimal Lt neck hematomaHematologic / Lymphatic: normal  Back: normal  Lungs: No increased work of breathing, good air exchange, clear  to auscultation bilaterally, no crackles or wheezing.  Cardiovascular: Regular rate and rhythm, normal S1 and S2, no S3 or S4, and no murmur noted.  Chest / Breast: normal  Abdomen: normal  Genitourinary: normal  Musculoskeletal: No redness, warmth, or swelling of the joints.  Full range of motion noted.  Motor strength is 5 out of 5 all extremities bilaterally.  Tone is normal.  Neurologic: nonfocal  Neuropsychiatric: normal  Skin: normal           Data:     Results for orders placed or performed during the hospital encounter of 09/09/20 (from the past 24 hour(s))   CBC with platelets   Result Value Ref Range    WBC 13.3 (H) 4.0 - 11.0 10e9/L    RBC Count 3.88 3.8 - 5.2 10e12/L    Hemoglobin 11.0 (L) 11.7 - 15.7 g/dL    Hematocrit 35.3 35.0 - 47.0 %    MCV 91 78 - 100 fl    MCH 28.4 26.5 - 33.0 pg    MCHC 31.2 (L) 31.5 - 36.5 g/dL    RDW 14.4 10.0 - 15.0 %    Platelet Count 282 150 - 450 10e9/L   Basic metabolic panel   Result Value Ref Range    Sodium 140 133 - 144 mmol/L    Potassium 4.4 3.4 - 5.3 mmol/L    Chloride 111 (H) 94 - 109 mmol/L    Carbon Dioxide 24 20 - 32 mmol/L    Anion Gap 5 3 - 14 mmol/L    Glucose 132 (H) 70 - 99 mg/dL    Urea Nitrogen 21 7 - 30 mg/dL    Creatinine 1.15 (H) 0.52 - 1.04 mg/dL    GFR Estimate 45 (L) >60 mL/min/[1.73_m2]    GFR Estimate If Black 52 (L) >60 mL/min/[1.73_m2]    Calcium 8.0 (L) 8.5 - 10.1 mg/dL

## 2020-09-11 LAB
ANION GAP SERPL CALCULATED.3IONS-SCNC: 3 MMOL/L (ref 3–14)
BASOPHILS # BLD AUTO: 0 10E9/L (ref 0–0.2)
BASOPHILS NFR BLD AUTO: 0.1 %
BUN SERPL-MCNC: 23 MG/DL (ref 7–30)
CALCIUM SERPL-MCNC: 8.3 MG/DL (ref 8.5–10.1)
CHLORIDE SERPL-SCNC: 111 MMOL/L (ref 94–109)
CO2 SERPL-SCNC: 28 MMOL/L (ref 20–32)
CREAT SERPL-MCNC: 1.12 MG/DL (ref 0.52–1.04)
DIFFERENTIAL METHOD BLD: ABNORMAL
EOSINOPHIL # BLD AUTO: 0.1 10E9/L (ref 0–0.7)
EOSINOPHIL NFR BLD AUTO: 0.9 %
ERYTHROCYTE [DISTWIDTH] IN BLOOD BY AUTOMATED COUNT: 14.9 % (ref 10–15)
GFR SERPL CREATININE-BSD FRML MDRD: 46 ML/MIN/{1.73_M2}
GLUCOSE SERPL-MCNC: 89 MG/DL (ref 70–99)
HCT VFR BLD AUTO: 37.7 % (ref 35–47)
HGB BLD-MCNC: 11.3 G/DL (ref 11.7–15.7)
IMM GRANULOCYTES # BLD: 0 10E9/L (ref 0–0.4)
IMM GRANULOCYTES NFR BLD: 0.1 %
INTERPRETATION ECG - MUSE: NORMAL
LYMPHOCYTES # BLD AUTO: 0.9 10E9/L (ref 0.8–5.3)
LYMPHOCYTES NFR BLD AUTO: 9.2 %
MCH RBC QN AUTO: 27.6 PG (ref 26.5–33)
MCHC RBC AUTO-ENTMCNC: 30 G/DL (ref 31.5–36.5)
MCV RBC AUTO: 92 FL (ref 78–100)
MONOCYTES # BLD AUTO: 1.4 10E9/L (ref 0–1.3)
MONOCYTES NFR BLD AUTO: 14.1 %
NEUTROPHILS # BLD AUTO: 7.5 10E9/L (ref 1.6–8.3)
NEUTROPHILS NFR BLD AUTO: 75.6 %
NRBC # BLD AUTO: 0 10*3/UL
NRBC BLD AUTO-RTO: 0 /100
PLATELET # BLD AUTO: 296 10E9/L (ref 150–450)
POTASSIUM SERPL-SCNC: 4.3 MMOL/L (ref 3.4–5.3)
RBC # BLD AUTO: 4.1 10E12/L (ref 3.8–5.2)
SODIUM SERPL-SCNC: 142 MMOL/L (ref 133–144)
WBC # BLD AUTO: 9.9 10E9/L (ref 4–11)

## 2020-09-11 PROCEDURE — 36415 COLL VENOUS BLD VENIPUNCTURE: CPT | Performed by: INTERNAL MEDICINE

## 2020-09-11 PROCEDURE — 25000132 ZZH RX MED GY IP 250 OP 250 PS 637: Mod: GY | Performed by: INTERNAL MEDICINE

## 2020-09-11 PROCEDURE — 25000132 ZZH RX MED GY IP 250 OP 250 PS 637: Mod: GY | Performed by: HOSPITALIST

## 2020-09-11 PROCEDURE — 12000000 ZZH R&B MED SURG/OB

## 2020-09-11 PROCEDURE — 85025 COMPLETE CBC W/AUTO DIFF WBC: CPT | Performed by: INTERNAL MEDICINE

## 2020-09-11 PROCEDURE — 99233 SBSQ HOSP IP/OBS HIGH 50: CPT | Performed by: INTERNAL MEDICINE

## 2020-09-11 PROCEDURE — 25000132 ZZH RX MED GY IP 250 OP 250 PS 637: Mod: GY | Performed by: STUDENT IN AN ORGANIZED HEALTH CARE EDUCATION/TRAINING PROGRAM

## 2020-09-11 PROCEDURE — 82947 ASSAY GLUCOSE BLOOD QUANT: CPT | Performed by: PHYSICIAN ASSISTANT

## 2020-09-11 PROCEDURE — 99356 ZZC PROLONGED SERV,INPATIENT,1ST HR: CPT | Performed by: INTERNAL MEDICINE

## 2020-09-11 PROCEDURE — 36415 COLL VENOUS BLD VENIPUNCTURE: CPT | Performed by: STUDENT IN AN ORGANIZED HEALTH CARE EDUCATION/TRAINING PROGRAM

## 2020-09-11 PROCEDURE — 25000132 ZZH RX MED GY IP 250 OP 250 PS 637: Mod: GY | Performed by: PHYSICIAN ASSISTANT

## 2020-09-11 PROCEDURE — 80048 BASIC METABOLIC PNL TOTAL CA: CPT | Performed by: STUDENT IN AN ORGANIZED HEALTH CARE EDUCATION/TRAINING PROGRAM

## 2020-09-11 RX ORDER — CHLORTHALIDONE 25 MG/1
25 TABLET ORAL DAILY
Qty: 30 TABLET | Refills: 0 | Status: SHIPPED | OUTPATIENT
Start: 2020-09-12 | End: 2022-09-02

## 2020-09-11 RX ORDER — LISINOPRIL 20 MG/1
20 TABLET ORAL DAILY
Qty: 30 TABLET | Refills: 0 | Status: SHIPPED | OUTPATIENT
Start: 2020-09-12 | End: 2022-09-02

## 2020-09-11 RX ORDER — HYDRALAZINE HYDROCHLORIDE 20 MG/ML
10 INJECTION INTRAMUSCULAR; INTRAVENOUS EVERY 6 HOURS PRN
Status: DISCONTINUED | OUTPATIENT
Start: 2020-09-11 | End: 2020-09-12 | Stop reason: HOSPADM

## 2020-09-11 RX ORDER — METOPROLOL TARTRATE 25 MG/1
25 TABLET, FILM COATED ORAL 2 TIMES DAILY
Status: DISCONTINUED | OUTPATIENT
Start: 2020-09-11 | End: 2020-09-11

## 2020-09-11 RX ORDER — CHLORTHALIDONE 25 MG/1
25 TABLET ORAL DAILY
Status: DISCONTINUED | OUTPATIENT
Start: 2020-09-11 | End: 2020-09-12 | Stop reason: HOSPADM

## 2020-09-11 RX ADMIN — ROSUVASTATIN CALCIUM 40 MG: 20 TABLET, FILM COATED ORAL at 22:17

## 2020-09-11 RX ADMIN — METOPROLOL TARTRATE 25 MG: 25 TABLET, FILM COATED ORAL at 09:02

## 2020-09-11 RX ADMIN — CHLORTHALIDONE 25 MG: 25 TABLET ORAL at 11:42

## 2020-09-11 RX ADMIN — ASPIRIN 81 MG: 81 TABLET, COATED ORAL at 09:02

## 2020-09-11 RX ADMIN — APIXABAN 5 MG: 5 TABLET, FILM COATED ORAL at 20:07

## 2020-09-11 RX ADMIN — ACETAMINOPHEN 975 MG: 325 TABLET, FILM COATED ORAL at 00:11

## 2020-09-11 RX ADMIN — ACETAMINOPHEN 975 MG: 325 TABLET, FILM COATED ORAL at 19:07

## 2020-09-11 RX ADMIN — APIXABAN 5 MG: 5 TABLET, FILM COATED ORAL at 09:02

## 2020-09-11 RX ADMIN — LISINOPRIL 20 MG: 20 TABLET ORAL at 09:02

## 2020-09-11 RX ADMIN — POLYETHYLENE GLYCOL 3350 17 G: 17 POWDER, FOR SOLUTION ORAL at 09:13

## 2020-09-11 RX ADMIN — ACETAMINOPHEN 975 MG: 325 TABLET, FILM COATED ORAL at 09:16

## 2020-09-11 ASSESSMENT — ACTIVITIES OF DAILY LIVING (ADL)
ADLS_ACUITY_SCORE: 21
ADLS_ACUITY_SCORE: 20
ADLS_ACUITY_SCORE: 22
ADLS_ACUITY_SCORE: 20

## 2020-09-11 ASSESSMENT — MIFFLIN-ST. JEOR: SCORE: 1516.88

## 2020-09-11 NOTE — PROGRESS NOTES
HOSPITALIST CONSULT CHART CHECK      Hospitalist service was consulted for cross coverage only. We will peripherally follow and chart check throughout the week.        - For vascular medical concerns during business hours M-F, call the [facility] at [phone] to have the rounding/on call Vascular Medicine (NOT VASCULAR SURGERY) MD paged.      - After business hours M-F, for medical concerns on this patient, please page hospitalist staff.      - For vascular surgical questions, please page the appropriate surgeon (primary vascular surgeon or on call vascular surgeon) based upon the time of day.     Farhana Dwyer (Shaw), PAFlorinC  Hospitalist Physician Assistant  9/11/2020 7:22 AM

## 2020-09-11 NOTE — PROGRESS NOTES
"VASCULAR SURGERY PROGRESS NOTE    Subjective:  Doing ok. Walked around the unit several times yesterday. Using her IS and getting up to about 1500.     Complaining of intermittent sensation of a warm spot on the top of her head and feeling shaky mainly on the right side. This does not happen all the time. She denies any loss of vision. Denies weakness or sensation loss in her extremities. Denies nausea or vomiting.     Lisinopril started yesterday. BP improved but SBPs around 150     Objective:    Intake/Output Summary (Last 24 hours) at 9/10/2020 0725  Last data filed at 9/10/2020 0553  Gross per 24 hour   Intake 2959 ml   Output 725 ml   Net 2234 ml     Labs:  ROUTINE IP LABS (Last four results)  BMP  Recent Labs   Lab 09/10/20  0719 09/09/20  0912    139   POTASSIUM 4.4 4.3   CHLORIDE 111* 109   GENEVIEVE 8.0* 8.2*   CO2 24 24   BUN 21 19   CR 1.15* 1.05*   * 105*     CBC  Recent Labs   Lab 09/10/20  0719 09/09/20  1049   WBC 13.3*  --    RBC 3.88  --    HGB 11.0*  --    HCT 35.3  --    MCV 91  --    MCH 28.4  --    MCHC 31.2*  --    RDW 14.4  --     299     INRNo lab results found in last 7 days.  PHYSICAL EXAM:  BP (!) 150/66   Pulse 65   Temp 97.3  F (36.3  C) (Oral)   Resp 16   Ht 1.575 m (5' 2\")   Wt 108.2 kg (238 lb 8 oz)   LMP  (LMP Unknown)   SpO2 93%   BMI 43.62 kg/m    General: The patient is alert and oriented. Appropriate. No acute distress  Psych: pleasant affect, answers questions appropriately  Skin: Color appropriate for race, warm, dry.  Neck: left neck incision with steri-strips intact, no hematoma or drainage, soft   Respiratory: The patient does not require supplemental oxygen. Breathing unlabored  GI:  Abdomen soft, nontender to light palpation.  Neuro: 5/5 strength in bilateral upper and lower extremities, CN II-XII grossly intact        ASSESSMENT:  POD#2 from left CEA for asymptomatic high grade stenosis. Doing well. I do not have an answer for her intermittent " shaking and hot spot on her head. Low concern for embolic etiology. Neuro exam normal.     She would like to stay one more day with these new symptoms to make sure they do not get worse as she lives alone and has no one to watch her.       PLAN:  -plan to discharge tomorrow - Saturday 9/12   -BP goal 100 to 160   -lisinopril started yesterday   -f/u BMP   -regular diet   -continue PTA Abixiban   -continue baby ASA   -continue PTA metoprolol   -continue crestor     -d/w Dr Yair Lutz MD   General Surgery Resident, PGY-4   Pager: [phone]

## 2020-09-11 NOTE — PLAN OF CARE
A/Ox4. Bradycardic at baseline also hypertensive. LS clear. +Bs, +flatus, -bm. Voiding adequately. Left neck incision ecchymotic with steri strips cdi/rigo. Denies pain. Tolerating diet. Up with sba and cane. Possible discharge today.

## 2020-09-11 NOTE — PROGRESS NOTES
[facility]    Vascular Medicine Progress Note    Date of Service (when I saw the patient): 09/11/2020     Assessment & Plan      Narcisa Harris is a 80 year old female who was admitted on 9/9/2020.     1. Asymptomatic progressive high grade left carotid artery stenosis s/p  a left carotid endarterectomy with external jugular vein patch on  9/9/20    POD 2  C/o EEG lead site on scalp area discomfort   BP is very high pre op and now   Post-operative cares per Dr. Mazariegos / Vascular Surgery.   She was on Eliquis for atrial fibrillation and aspirin as an outpatient. Resume when able once cleared by [facility] surgery  ( reviewed I cardiologist eval , they are planning anticoagulation until October 2020, 5 day zio patch etc)  Telemetry while here   Surgical site looks good  No neuro deficits.    continue lisinopril and metorprolol PTA  dose 25 bid , HR in low 60s, may not tolerate increase of BB  Add chlorthalidone 25 mg daily  Cover with IV labetolol    2. Hyperlipidemia     Her lipid panel this admission is significant for an LDL of 72 while on Crestor 10 mg daily. Given her CAD and carotid disease, she should optially be treated more aggressively to an LDL of less than 70. Therefore, her Crestor dose will be increased to 40 mg daily. She should then have a lipid panel repeated in 3 months to ascertain whether or not she is at goal on this regimen.      3. CAD s/p CABG 6/2020     She has recovered well from this. She is presently asymptomatic. Continue medical management.      4. Atrial fibrillation     She is on chronic anticoagulation with Eliquis and rate controlled with Metoprolol. Continue the same post-operatively.      5. Hypertension   very High BP this admission  Currently taking lisinopril 20 mg and metoprolol 25 bid,  continue same   Will add chlorthalidone 25 mg daily   IV hydralazine PRN if SBP>160      6. GERD     Currently controlled with Omeprazole.     patient care time spent 65 minutes  today, prolonged service due to medical complexity,  initial eval, then re-eval and review of extensive outside records in [facility] care everywhere.    Prudencio Oliveira MD, JOSSY, [facility]  Vascular Medicine    Interval History     C/o scalp area discomfort from EEG leads   No HA or dizziness  No neuro deficits  Surgical site looks good.  Very high BP     Physical Exam   Temp: 97.7  F (36.5  C) Temp src: Oral BP: (!) 172/61 Pulse: 65   Resp: 16 SpO2: 92 % O2 Device: None (Room air)    Vitals:    09/09/20 0920 09/10/20 0547 09/11/20 0545   Weight: 107 kg (236 lb) 108.2 kg (238 lb 8 oz) 108.9 kg (240 lb)     Vital Signs with Ranges  Temp:  [97.3  F (36.3  C)-98.8  F (37.1  C)] 97.7  F (36.5  C)  Pulse:  [59-65] 65  Resp:  [16-18] 16  BP: (135-172)/(44-66) 172/61  SpO2:  [92 %-98 %] 92 %  I/O last 3 completed shifts:  In: 360 [P.O.:360]  Out: 1650 [Urine:1650]    Constitutional: Awake, alert, cooperative, no apparent distress, and appears stated age.  Eyes: Lids and lashes normal, pupils equal, round and reactive to light, extra ocular muscles intact, sclera clear, conjunctiva normal.  ENT: Normocephalic, without obvious abnormality  Respiratory: No increased work of breathing, good air exchange, clear to auscultation bilaterally, no crackles or wheezing.  Cardiovascular: Normal apical impulse, regular rate and rhythm, normal S1 and S2, no S3 or S4, and no murmur noted.  GI:  normal bowel sounds, soft, non-distended, non-tender, no masses palpated, no hepatosplenomegaly.  Lymph/Hematologic: No cervical lymphadenopathy and no supraclavicular lymphadenopathy.  Skin: No bruising or bleeding, normal skin color, texture, turgor, no redness, warmth, or swelling, no rashes, no lesions,  nails normal without discoloration or clubbing and no jaundice.  Musculoskeletal: There is no redness, warmth, or swelling of the joints.  Full range of motion noted.  Motor strength is 5 out of 5 all extremities bilaterally.  Tone is  normal.  Neurologic: Awake, alert, oriented to name, place and time.  Cranial nerves II-XII are grossly intact.  Motor is 5 out of 5 bilaterally.   Neuropsychiatric: Calm, normal eye contact, alert, normal affect, oriented to self, place, time and situation, memory for past and recent events intact and thought process normal.  Pulses:  Good palpable pulses.    Medications     BETA BLOCKER NOT PRESCRIBED       sodium chloride 70 mL/hr at 09/09/20 1658       acetaminophen  975 mg Oral Q8H     apixaban ANTICOAGULANT  5 mg Oral BID     aspirin  81 mg Oral Daily     chlorthalidone  25 mg Oral Daily     lisinopril  20 mg Oral Daily     metoprolol tartrate  25 mg Oral BID     rosuvastatin  40 mg Oral At Bedtime     sodium chloride (PF)  3 mL Intracatheter Q8H       Data   Recent Labs   Lab 09/11/20  0823 09/10/20  0719 09/09/20  1049 09/09/20  0912   WBC 9.9 13.3*  --   --    HGB 11.3* 11.0*  --   --    MCV 92 91  --   --     282 299  --     140  --  139   POTASSIUM 4.3 4.4  --  4.3   CHLORIDE 111* 111*  --  109   CO2 28 24  --  24   BUN 23 21  --  19   CR 1.12* 1.15*  --  1.05*   ANIONGAP 3 5  --  6   GENEVIEVE 8.3* 8.0*  --  8.2*   GLC 89 132*  --  105*

## 2020-09-11 NOTE — DISCHARGE SUMMARY
[facility] Discharge Summary    Narcisa Harris MRN# [id number]   Age: 80 year old YOB: 1940     Date of Admission:  9/9/2020  Date of Discharge:  9/12/2020  Admitting Provider:  Peter Mazariegos MD  Discharge Provider:  Peter Mazariegos MD   Discharging Service: Vascular Surgery      Primary Provider: Ileana Hilton  Primary Care Physician Phone Number: [phone]          Admission Diagnoses:   Principle Diagnosis: Carotid stenosis, asymptomatic, bilateral [I65.23]  Secondary Diagnoses:          Discharge Diagnosis:     Carotid stenosis, asymptomatic, bilateral [I65.23]          Procedures:     Procedure(s): Left Carotid Endarterectomy             Discharge Medications:     Current Discharge Medication List      START taking these medications    Details   lisinopril (ZESTRIL) 20 MG tablet Take 1 tablet (20 mg) by mouth daily  Qty: 30 tablet, Refills: 0    Associated Diagnoses: Carotid stenosis, asymptomatic, bilateral         CONTINUE these medications which have NOT CHANGED    Details   apixaban ANTICOAGULANT (ELIQUIS) 5 MG tablet Take 5 mg by mouth 2 times daily      aspirin 81 MG tablet Take 1 tablet by mouth daily.      Ferrous Fumarate 63 (20 Fe) MG TABS Take 65 mg by mouth daily       metoprolol tartrate (LOPRESSOR) 25 MG tablet Take 25 mg by mouth 2 times daily      omeprazole (PRILOSEC) 20 MG DR capsule Take 20 mg by mouth daily as needed       rosuvastatin (CRESTOR) 10 MG tablet Take 10 mg by mouth At Bedtime                  Allergies:         Allergies   Allergen Reactions     Atorvastatin Muscle Pain (Myalgia)     Lisinopril Cough             Brief History of Illness:   Narcisa Harris is a 81 yo female who was noted to have high grade stenosis of her left carotid artery. She was asymptomatic. She was planning to have an endarterectomy earlier but her pre-op work-up showed severe CAD with an abnormal stress test. She had a 3-CABG about 2 months ago and is now  "cleared for her carotid surgery.    After discussing the risks, benefits, and possible complications, informed consent was obtained and the patient underwent the above procedure.  There were no complications.  Please see the Operative Report for full details.           Hospital Course:   Narcisa Harris's hospital course was unremarkable.  She recovered as anticipated and experienced no post-operative complications. Her BP was elevated and she was started on 20mg lisinopril to help control this long term. She will continue on baby ASA and her PTA abixiban. She will follow-up with Dr Mazariegos via video visit in 1-2 weeks.     On the date of discharge, the patient was discharged to home in stable condition and afebrile.  She verbalized understanding of all discharge instructions and felt comfortable with the discharge plan.  She was asked to call with any further questions or concerns.         Condition on Discharge:        Discharge condition: Stable   Discharge vitals: Blood pressure (!) 144/51, pulse 59, temperature 97.4  F (36.3  C), temperature source Oral, resp. rate 16, height 1.575 m (5' 2\"), weight 108.2 kg (238 lb 8.6 oz), SpO2 96 %, not currently breastfeeding.           Discharge Disposition:     Discharged to home          Discharge Instructions and Follow-Up:      Narcisa Harris was asked to follow up with surgical team in 1-2 weeks.    Jaylon Lutz MD   Pager: [phone]     STAFF:   Agree with above.         Peter Mazariegos MD   "

## 2020-09-11 NOTE — PLAN OF CARE
VSS on room air, BP's improved. A/Ox4, neuros intact. Incision on left neck with steri strips, ecchymotic. Denies pain. Up independently.  Low sat fat diet. BS active, Flatus +. Voiding adequately to bathroom. LS clear. Plan for discharge tomorrow.

## 2020-09-11 NOTE — DISCHARGE INSTRUCTIONS
If an appointment was recommended for you please be aware DUE TO COVID-19 PANDEMIC, MANY CLINICS ARE TEMPORARILY NOT TAKING IN-PERSON APPOINTMENTS. The clinic may schedule you for a virtual or phone visit--if this is the case, please answer your phone. If you develop any symptoms or have any followup questions please call your primary care clinic. If it is an emergency, please dial 911.      It is very important to check in with your provider--during a phone or clinic visit, talk about your hospital stay.  Tell the clinic provider how you feel.  Talk about the medications listed on the discharge papers.  Your doctor will update records, make sure you are still doing OK, and decide if any tests or medication changes are needed.

## 2020-09-11 NOTE — PROGRESS NOTES
Care Coordination:    Noted pt has order for followup with [facility] ([facility], [phone]) in 1-2 weeks on AVS with plans to discharge Saturday 9/12/2020.  To ensure appointment scheduling (schedulers not available on the weekend), contacted [facility].  [facility] notes Dr. Mazariegos has no availability next week; she is working with his nurses to see if pt can be scheduled for a virtual appointment sometime the following week.  Of note pt does not appear to have a MyChart set up or an email.    Addendum: the vascular clinic was able to schedule an appointment and add it to pt's AVS:     Sep 24, 2020  8:30 AM CDT   Telephone Visit with Peter Mazariegos [place]   [facility] [place]  Note: this is not an onsite visit; there is no need to come to the facility.  Please have a list of all current medications available for appointment.               An appointment was scheduled for you.  Please be aware DUE TO COVID-19 PANDEMIC, MANY CLINICS ARE TEMPORARILY NOT TAKING IN-PERSON APPOINTMENTS. The clinic may schedule you for a virtual or phone visit--if this is the case, please answer your phone. If you develop any symptoms or have any followup questions please call your primary care clinic. If it is an emergency, please dial 911.      It is very important to check in with your provider--during a phone or clinic visit, talk about your hospital stay.  Tell the clinic provider how you feel.  Talk about the medications listed on the discharge papers.  Your doctor will update records, make sure you are still doing OK, and decide if any tests or medication changes are needed.      Ania Turcios RN, BSN, PHN  [facility] Care Coordination  [facility]   Mobile: [phone]

## 2020-09-12 VITALS
HEIGHT: 62 IN | WEIGHT: 238.54 LBS | OXYGEN SATURATION: 96 % | BODY MASS INDEX: 43.9 KG/M2 | SYSTOLIC BLOOD PRESSURE: 146 MMHG | RESPIRATION RATE: 16 BRPM | HEART RATE: 60 BPM | TEMPERATURE: 97.7 F | DIASTOLIC BLOOD PRESSURE: 50 MMHG

## 2020-09-12 PROCEDURE — 99233 SBSQ HOSP IP/OBS HIGH 50: CPT | Performed by: INTERNAL MEDICINE

## 2020-09-12 PROCEDURE — 25000132 ZZH RX MED GY IP 250 OP 250 PS 637: Mod: GY | Performed by: INTERNAL MEDICINE

## 2020-09-12 PROCEDURE — 25000132 ZZH RX MED GY IP 250 OP 250 PS 637: Mod: GY | Performed by: PHYSICIAN ASSISTANT

## 2020-09-12 PROCEDURE — 25000132 ZZH RX MED GY IP 250 OP 250 PS 637: Mod: GY | Performed by: STUDENT IN AN ORGANIZED HEALTH CARE EDUCATION/TRAINING PROGRAM

## 2020-09-12 PROCEDURE — 25000128 H RX IP 250 OP 636: Performed by: INTERNAL MEDICINE

## 2020-09-12 RX ADMIN — CHLORTHALIDONE 25 MG: 25 TABLET ORAL at 08:08

## 2020-09-12 RX ADMIN — ACETAMINOPHEN 975 MG: 325 TABLET, FILM COATED ORAL at 08:08

## 2020-09-12 RX ADMIN — APIXABAN 5 MG: 5 TABLET, FILM COATED ORAL at 08:00

## 2020-09-12 RX ADMIN — ACETAMINOPHEN 975 MG: 325 TABLET, FILM COATED ORAL at 03:12

## 2020-09-12 RX ADMIN — ASPIRIN 81 MG: 81 TABLET, COATED ORAL at 08:01

## 2020-09-12 RX ADMIN — POLYETHYLENE GLYCOL 3350 17 G: 17 POWDER, FOR SOLUTION ORAL at 08:09

## 2020-09-12 RX ADMIN — LISINOPRIL 20 MG: 20 TABLET ORAL at 08:08

## 2020-09-12 RX ADMIN — HYDRALAZINE HYDROCHLORIDE 10 MG: 20 INJECTION INTRAMUSCULAR; INTRAVENOUS at 03:22

## 2020-09-12 ASSESSMENT — MIFFLIN-ST. JEOR: SCORE: 1510.25

## 2020-09-12 ASSESSMENT — ACTIVITIES OF DAILY LIVING (ADL)
ADLS_ACUITY_SCORE: 16
ADLS_ACUITY_SCORE: 20
ADLS_ACUITY_SCORE: 20
ADLS_ACUITY_SCORE: 16

## 2020-09-12 NOTE — PLAN OF CARE
Pt is alert and oriented x 4, neuro's intact. Elevated SBP treated with hydralazine. Incision ecchymotic, intact. Tylenol for pain.

## 2020-09-12 NOTE — PROGRESS NOTES
[facility]  Vascular Medicine Progress Note          Assessment and Plan:          1. Asymptomatic progressive high grade left carotid artery stenosis s/p  a left carotid endarterectomy with external jugular vein patch on  9/9/20     POD 3  She was on Eliquis for atrial fibrillation and aspirin as an outpatient. Will resume.  Surgical site looks good, tremors resolved.  Home today.       2. Hyperlipidemia     Her lipid panel this admission is significant for an LDL of 72 while on Crestor 10 mg daily. Given her CAD and carotid disease, she should optially be treated more aggressively to an LDL of less than 70. Therefore, her Crestor dose will be increased to 40 mg daily. She should then have a lipid panel repeated in 3 months to ascertain whether or not she is at goal on this regimen.      3. CAD s/p CABG 6/2020     She has recovered well from this. She is presently asymptomatic. Continue medical management.      4. Atrial fibrillation     She is on chronic anticoagulation with Eliquis and rate controlled with Metoprolol. Continue the same post-operatively.      5. Hypertension  High BP this admission  Currently taking lisinopril 20 mg and metoprolol 25 bid,  continue same. Added chlorthalidone 25 mg daily.  IV hydralazine PRN if SBP>160      6. GERD     Currently controlled with Omeprazole.              Interval History:   doing well; no cp, sob, n/v/d, or abd pain.              Review of Systems:   The 10 point Review of Systems is negative other than noted in the HPI             Medications:       acetaminophen  975 mg Oral Q8H     apixaban ANTICOAGULANT  5 mg Oral BID     aspirin  81 mg Oral Daily     chlorthalidone  25 mg Oral Daily     lisinopril  20 mg Oral Daily     metoprolol tartrate  25 mg Oral BID     rosuvastatin  40 mg Oral At Bedtime     sodium chloride (PF)  3 mL Intracatheter Q8H                  Physical Exam:     Patient Vitals for the past 24 hrs:   BP Temp Temp src Pulse Resp  SpO2 Weight   09/12/20 0835 (!) 144/51 -- -- 59 -- 96 % --   09/12/20 0754 (!) 162/54 97.4  F (36.3  C) Oral 59 16 96 % --   09/12/20 0645 -- -- -- -- -- -- 108.2 kg (238 lb 8.6 oz)   09/12/20 0400 (!) 154/55 -- -- 55 -- -- --   09/12/20 0312 (!) 182/59 -- -- 59 16 -- --   09/12/20 0000 (!) 152/61 97.6  F (36.4  C) Oral 50 16 96 % --   09/11/20 2001 (!) 155/54 97.5  F (36.4  C) Oral (!) 49 16 94 % --   09/11/20 1531 (!) 140/50 97.8  F (36.6  C) Oral 51 16 95 % --   09/11/20 1312 (!) 148/59 -- -- (!) 46 -- -- --   09/11/20 1120 (!) 172/61 97.8  F (36.6  C) Oral 50 16 94 % --     Wt Readings from Last 4 Encounters:   09/12/20 108.2 kg (238 lb 8.6 oz)   10/25/18 95.3 kg (210 lb)   09/27/13 103.2 kg (227 lb 8 oz)   06/21/13 105.2 kg (232 lb)       Intake/Output Summary (Last 24 hours) at 9/12/2020 1117  Last data filed at 9/12/2020 0645  Gross per 24 hour   Intake 240 ml   Output 1680 ml   Net -1440 ml     Constitutional: normal  Eyes: normal  ENT: normal  Neck: incision CDI  Hematologic / Lymphatic: normal  Back: normal  Lungs: No increased work of breathing, good air exchange, clear to auscultation bilaterally, no crackles or wheezing.  Cardiovascular: Regular rate and rhythm, normal S1 and S2, no S3 or S4, and no murmur noted.  Chest / Breast: normal  Abdomen: normal  Genitourinary: normal  Musculoskeletal: normal  Neurologic: normal  Neuropsychiatric: normal  Skin: normal           Data:   No results found for this or any previous visit (from the past 24 hour(s)).

## 2020-09-12 NOTE — PROGRESS NOTES
"VASCULAR SURGERY PROGRESS NOTE    Subjective:  No acute events. Discharged held due to tremors. Feels ready for discharge today. Denies changes in vision, speech, no focal motor or sensory deficits. Eating well    Objective:    Intake/Output Summary (Last 24 hours) at 9/12/2020 1019  Last data filed at 9/12/2020 0645  Gross per 24 hour   Intake 240 ml   Output 1980 ml   Net -1740 ml         Labs:  ROUTINE IP LABS (Last four results)  BMP  Recent Labs   Lab 09/11/20  0823 09/10/20  0719 09/09/20  0912    140 139   POTASSIUM 4.3 4.4 4.3   CHLORIDE 111* 111* 109   GENEVIEVE 8.3* 8.0* 8.2*   CO2 28 24 24   BUN 23 21 19   CR 1.12* 1.15* 1.05*   GLC 89 132* 105*     CBC  Recent Labs   Lab 09/11/20  0823 09/10/20  0719 09/09/20  1049   WBC 9.9 13.3*  --    RBC 4.10 3.88  --    HGB 11.3* 11.0*  --    HCT 37.7 35.3  --    MCV 92 91  --    MCH 27.6 28.4  --    MCHC 30.0* 31.2*  --    RDW 14.9 14.4  --     282 299     INRNo lab results found in last 7 days.    PHYSICAL EXAM:  BP (!) 144/51 (BP Location: Right arm)   Pulse 59   Temp 97.4  F (36.3  C) (Oral)   Resp 16   Ht 1.575 m (5' 2\")   Wt 108.2 kg (238 lb 8.6 oz)   LMP  (LMP Unknown)   SpO2 96%   BMI 43.63 kg/m    General: The patient is alert and oriented. Appropriate. No acute distress  Psych: pleasant affect, answers questions appropriately  Skin: Color appropriate for race, warm, dry; Left incision c/d/i with Steri-strips  Respiratory: The patient does not require supplemental oxygen. Breathing unlabored  GI:  Abdomen soft, nontender to light palpation.  Neuro: CN 2- 12 grossly intact, no deficits in strength and sensation   Extremities: BL pulse palpable,  no edema noted.          Imaging:   No results found for this or any previous visit (from the past 24 hour(s)).    ASSESSMENT:  80F s/p L CEA POD3      PLAN:  Restart all home medications  Continue PTA apixiban/ASA/Crestor  Home today      Babak Wilson MD   Vascular Surgery Fellow  [facility]" [place]

## 2020-09-12 NOTE — PLAN OF CARE
A&OX4. VSS on RA, except BP still elevated. Lisinopril given. Up w/ SBA. Voiding frequently. Neuro intact. Dressing to neck, CDI, BINU. Taking tylenol for pain. Discharge instruction and medication went over with pt and her son, Tyson. Questions and concerns answered. Pt discharge to home.

## 2020-09-12 NOTE — PLAN OF CARE
A&Ox4. VSS on RA. Tono in 50s baseline. Left neck incision with steri strips, ecchymotic. Neuros intact. CMS intact except BLE edema +3. Denies pain. LS clear. Voiding adequately. Up SBA with cane. Plan to discharge in AM.

## 2020-09-12 NOTE — PLAN OF CARE
A&OX4. VSS on RA, except BP elevated. Some numbness present around the neck, no issue swallow observed. Neuro intact. Taking only scheduled tylenol for pain. Up w/ SBA with cane. Adequate I/O. Plan to discharge trw. Continue to monitor.

## 2020-09-23 NOTE — PROGRESS NOTES
[place] VASCULAR [facility]    Narcisa Harris underwent a left CEA on 9/10/2020 with no complications.  She was hypertensive postoperatively despite her prior medications and was thus kept in the hospital until 9/12/2020 which time her blood pressure is believed controlled with the addition of lisinopril 20 mg daily and chlorthalidone.  She restarted her chronic Eliquis and aspirin prior to discharge.      Admit SCr= 1.05   A1c= 5.5  LDL= 72 (Crestor)      We had a telephone consultation today.  She is doing very well.  She is checking her blood pressure at home and this morning it was 137/70 which seems to be consistent.      She should stay on her present blood pressure medications and this can be adjusted pending her blood pressure with her primary care physician.      Impression: #1.  Doing well following left CEA.  Plan follow-up duplex in 3 months.  We will continue her chronic Eliquis and aspirin.                        #2.  Hypertension that was not controlled at the time of her surgery.  Medications as above.  This can be followed up with her primary care physician physician.                         #3.   Hyperlipidemia with LDL at goal on Crestor.         Peter Mazariegos MD     CC:  Dr Ileana Hilton

## 2020-09-24 ENCOUNTER — VIRTUAL VISIT (OUTPATIENT)
Dept: OTHER | Facility: CLINIC | Age: 80
End: 2020-09-24
Attending: SURGERY
Payer: MEDICARE

## 2020-09-24 DIAGNOSIS — I65.22 CAROTID STENOSIS, ASYMPTOMATIC, LEFT: Primary | ICD-10-CM

## 2020-09-24 PROCEDURE — 99024 POSTOP FOLLOW-UP VISIT: CPT | Mod: ZP | Performed by: SURGERY

## 2020-09-24 RX ORDER — LOSARTAN POTASSIUM 50 MG/1
50 TABLET ORAL EVERY MORNING
COMMUNITY

## 2020-09-24 NOTE — PROGRESS NOTES
"Mauri Harris is a 80 year old female who is being evaluated via a billable telephone visit.      The patient has been notified of following:     \"This telephone visit will be conducted via a call between you and your physician/provider. We have found that certain health care needs can be provided without the need for a physical exam.  This service lets us provide the care you need with a short phone conversation.  If a prescription is necessary we can send it directly to your pharmacy.  If lab work is needed we can place an order for that and you can then stop by our lab to have the test done at a later time.    Telephone visits are billed at different rates depending on your insurance coverage. During this emergency period, for some insurers they may be billed the same as an in-person visit.  Please reach out to your insurance provider with any questions.    If during the course of the call the physician/provider feels a telephone visit is not appropriate, you will not be charged for this service.\"    Patient has given verbal consent for Telephone visit?  Yes    What phone number would you like to be contacted at? [phone]    How would you like to obtain your AVS? Mail a copy    Phone call duration: 5 minutes    Peter Mazariegos MD    "

## 2020-12-15 ENCOUNTER — HOSPITAL ENCOUNTER (OUTPATIENT)
Dept: ULTRASOUND IMAGING | Facility: CLINIC | Age: 80
Discharge: HOME | End: 2020-12-15
Attending: SURGERY | Admitting: SURGERY
Payer: MEDICARE

## 2020-12-15 DIAGNOSIS — I65.22 CAROTID STENOSIS, ASYMPTOMATIC, LEFT: ICD-10-CM

## 2020-12-15 PROCEDURE — 93882 EXTRACRANIAL UNI/LTD STUDY: CPT | Mod: LT

## 2020-12-15 NOTE — PROGRESS NOTES
[facility]    Narcisa Harris is been followed for asymptomatic CTA 50% right and 76% left carotid stenosis.  We recommend a left CEA.  Cardiac work-up revealed a markedly abnormal stress test  required a coronary bypass graft.  Did well following the surgery.  Proceeded with a left CEA with external jugular vein patch on 9/9/2020 with no complications.    Reports that she is doing well.  She does have some mandibular numbness related to surgery that we discussed and usually gradually resolves.  Her cardiologist had recommended another Zio patch monitoring and plans to see her following this.  She has had no ischemic symptoms following her cardiac bypass.        PMH: Medications: Zestril, Cozaar, Lopressor, hydralazine, Prilosec, Eliquis, Crestor, aspirin            Medical: Hypertension                          Hyperlipidemia-last LDL= 72                          CAD requiring coronary bypass graft June 2020                                     Postoperative paroxysmal atrial fibrillation placed on Eliquis                           GERD                            No diabetes with recent A1c= 5.0. (Highest glucose= 132 postoperatively)      12/15/2020 carotid duplex reveals a widely patent left CEA with no recurrent stenosis.        TELEPHONE CONSULTATION:    With the ongoing COVID-19 pandemic we had a telephone follow-up today.  She reports that she is feeling well and has not been ill.  Has a history of paroxysmal atrial fibrillation and is still on the Eliquis.  Plans to have a cardiac monitoring test performed prior to seeing her cardiologist in the near future.    Apparently had some episodes of cellulitis on her scalp being treated by her physician with antibiotics but not complete resolution.  She will follow up with Dr. Hilton.    I discussed the follow-up carotid duplex ultrasound finding with a widely patent CEA.      Impression: Doing well following left CEA.  Prior to surgery she was  noted to have elevated velocities bilaterally but CTA revealed only a 50% stenosis on the right.  I told her this is of no clinical concern.  Follow bilateral carotid duplex will be performed 1 year postoperatively.    Spent 12 minutes on the telephone consultation today       Peter Mazariegos MD

## 2020-12-17 ENCOUNTER — VIRTUAL VISIT (OUTPATIENT)
Dept: OTHER | Facility: CLINIC | Age: 80
End: 2020-12-17
Attending: SURGERY
Payer: MEDICARE

## 2020-12-17 DIAGNOSIS — Z98.890 HISTORY OF LEFT-SIDED CAROTID ENDARTERECTOMY: Primary | ICD-10-CM

## 2020-12-17 DIAGNOSIS — E78.5 HYPERLIPIDEMIA LDL GOAL <70: ICD-10-CM

## 2020-12-17 DIAGNOSIS — I65.21 ASYMPTOMATIC STENOSIS OF RIGHT CAROTID ARTERY: ICD-10-CM

## 2020-12-17 PROCEDURE — 99442 PR PHYSICIAN TELEPHONE EVALUATION 11-20 MIN: CPT | Performed by: SURGERY

## 2020-12-17 NOTE — PROGRESS NOTES
"Narcisa Harris is a 80 year old female who is being evaluated via a billable telephone visit.      The patient has been notified of following:     \"This telephone visit will be conducted via a call between you and your physician/provider. We have found that certain health care needs can be provided without the need for a physical exam.  This service lets us provide the care you need with a short phone conversation.  If a prescription is necessary we can send it directly to your pharmacy.  If lab work is needed we can place an order for that and you can then stop by our lab to have the test done at a later time.    Telephone visits are billed at different rates depending on your insurance coverage. During this emergency period, for some insurers they may be billed the same as an in-person visit.  Please reach out to your insurance provider with any questions.    If during the course of the call the physician/provider feels a telephone visit is not appropriate, you will not be charged for this service.\"    Patient has given verbal consent for Telephone visit?  Yes    What phone number would you like to be contacted at? [phone]    How would you like to obtain your AVS? Mail a copy    Phone call duration: 12 minutes    Natali Scanlon MA      "

## 2021-04-03 ENCOUNTER — HEALTH MAINTENANCE LETTER (OUTPATIENT)
Age: 81
End: 2021-04-03

## 2021-09-12 ENCOUNTER — HEALTH MAINTENANCE LETTER (OUTPATIENT)
Age: 81
End: 2021-09-12

## 2021-09-25 NOTE — OP NOTE
Procedure Date: 09/09/2020      PREOPERATIVE DIAGNOSIS:  Progressively increasing asymptomatic left carotid stenosis.      POSTOPERATIVE DIAGNOSIS:  Progressively increasing asymptomatic left carotid stenosis.      PROCEDURES:   1.  Left carotid endarterectomy with external jugular vein patch angioplasty.   a.  Intraoperative shunting and EEG monitoring.      SURGEON:  Peter Mazariegos MD      FIRST ASSISTANT:  Jaylon Lutz MD ([facility] Surgery resident).      ANESTHESIA:  General.      PREOPERATIVE MEDICATIONS:  Ancef 2 grams IV.      INDICATIONS:  A 80-year-old patient has been followed for asymptomatic carotid stenosis.  This is now greater than 76% on the left and 50% on the right.  We felt that she would benefit from a left carotid endarterectomy that we discussed prior to the onset of the COVID-19 pandemic.  However, as she was getting ready to undergo the surgery, she had an abnormal preoperative stress test and required a coronary bypass graft from which she has recovered well.  She comes to the operating room today under informed consent.      DESCRIPTION OF PROCEDURE:  Radial arterial line was placed in preinduction.  She was brought to the operating room, induced under general anesthesia, orally intubated without difficulty.  A rolled towel was placed under her shoulders.  Calf pneumatic compression boots were used, and pillows placed under her knees.  Left neck area was prepped and draped.  Timeout was called, and the sites were identified.      VASCULAR EXPOSURE:  Despite being a larger woman with a weight of 103 kg, she had a relatively thin neck.  A standard left carotid incision was made.  Dissection was carried down to the platysma.  We identified a small but very adequate external jugular vein.  This was dissected free, ligating several small branches between 7-0 Prolene suture.  We had good visualization of the auricular nerve, which was not manipulated.  We divided the mandibular cutaneous nerve  and marked this with 7-0 Prolene suture for later reanastomosis.  Sternocleidomastoid muscles retracted laterally.  We identified the internal jugular vein.  The patient had a normal level of bifurcation.  The ansa cervicalis was easily identified as was the vagus nerve.  We identified though required very minimal manipulation of the hypoglossal nerve.  Common carotid artery had minimal disease and encircled with Silastic vessel loop.  External carotid artery beyond its orifice was free of disease, and this was encircled.  Significant plaque was quite calcified in the carotid bulb and proximal ICA for 2.5 cm distally at which point the artery was free of disease, measuring over 3 mm and encircled with Silastic vessel loops.  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      CAROTID ENDARTERECTOMY:  5000 units of intravenous heparin were given.  After 5 minutes, the vessel loops were sequentially tightened, with a stable EEG.  An 11 blade was used to open the common carotid and a Penn scissors to extend the arteriotomy through the calcified, somewhat irregular plaque but with no ulcerations to the distal ICA.  This was free of disease.  A preoperative Sundt shunt was inserted and secured with vessel proximally and Junaid clamp distally.  A very stable EEG.      Endarterectomy was performed down to the deep media with a spatula.  CC endpoint measured less than 1 mm in thickness and had a very nice transition.  Had excellent eversion.  Endarterectomy was performed in the external carotid artery with good backbleeding.  Distal endpoint in the ICA was cautiously made with the aid of a [place] blade distally.  This was tacked down very nicely with no step-off with interrupted 7-0 Prolene suture.  All loose medial fibers were removed down to a very smooth plane.      EXTERNAL JUGULAR VEIN PATCH:  External jugular vein was harvested from the neck ligating proximally and  distally with 3-0 silk suture.  Gently dilated with 0.5% Marcaine.  This was everted, left double thickness in the normal direction of flow and sewn to arteriotomy with running 6-0 Prolene suture.  Prior to complete closure, the shunt was removed, and the vessel was flushed, and blood flow was sequentially allowed to go up the ICA with an excellent pulse being noted and a stable EEG.  One 7-0 mattress Prolene was placed on our ICA vein patch for good hemostasis.  We had an excellent distal pulse.  Distal artery dilated very nicely.  Vein patch measured 5.5 cm in length.  A small amount of Surgicel was placed over this.  Neck was closed in layers with interrupted running 3-0 Vicryl suture.  Mandibular cutaneous nerve was reapproximated with 7-0 Prolene suture x 2.  Skin was closed with 4-0 Monocryl in subcuticular fashion.  Wounds were infiltrated with 0.5% Marcaine for post-analgesia along with Toradol 15 mg IV.  Steri-Strips and gauze dressing were applied.      The patient tolerated the procedure well.      ESTIMATED BLOOD LOSS:  Less than 30 mL.       COMPLICATIONS:  None.      The patient awoke upon the operating table and returned to the recovery room neurologically intact with stable hemodynamics.         LOLY MILLER MD             D: 2020   T: 2020   MT: PATRICIA      Name:     MONICA LEWIS   MRN:      [id number]        Account:        [id number]   :      1940           Procedure Date: 2020      Document: [id number]       OMEGA lizarraga

## 2022-01-26 NOTE — PROGRESS NOTES
[place] [facility]    Narcisa Harris which is a see me today for vascular follow-up.  She has been followed.  Asymptomatic greater than 75% left carotid stenosis with 50% right by CTA.  Preoperative CEA evaluation with abnormal cardiac stress test requiring coronary bypass graft.  Did well with the surgery and on 9/9/2020 left CEA performed with external jugular vein patch.    3-month duplex with a widely patent CEA with an elevated ICA PSV= 145 cm/s felt due to tortuosity with no visualized stenosis.    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  Very talkative and pleasant.  Normal affect.   Uses a wheeled walker due to her knee issues   Blood pressure 150/70 right arm.  Pulse 60.   HEENT= well-healed left carotid incision with mild mandibular numbness    Soft right carotid bruit    Very remote old idea of right neck abscess with soft tissue and full ROM   Chest= clear.  Healed sternotomy   Cardiovascular= regular rate   Extremities= marked lymphedema of thigh/calf but not foot.  Normal sensation    Pedal pulses difficult to palpate but +3 biphasic signal in right DP/PT    +3 biphasic/monophasic left DP Doppler    Carotid duplex reveals a widely patent left CEA.  PSV elevated 142 cm/s but no stenosis felt due to tortuosity.  Increased right ICA PSV= 259 cm/s with calcified plaque. (5/30/2019 right ICA PSV= 179 cm/s)        Impression: #1.  Widely patent left CEA.     #2.  Increasing velocity of right carotid calcified stenosis.  Difficult to determine what  degree of stenosis is actually represents.  CTA preoperatively revealed less than 50% stenosis but this may have progressed over the last 2 years.  Discussed the options of repeat CT at this time (recent SCr= 1.12 and GFR= 46) versus repeat right ultrasound in 6 months.  She has decided to proceed with the ultrasound and we will schedule this accordingly.       #3.  Probable mild PAD.  Bedside Doppler does reveal a very adequate blood flow particular the right leg if she decides to proceed with the right knee surgery.     #4.  She did notice some discomfort when she palpates her scalp with no obvious abnormalities.  She should bring this up with her primary care physician and she is getting a new physician since Dr. Hilton has moved to a different physician.      Peter Mazariegos MD    25 minutes with patient today.    This note was created using Dragon voice recognition software which may result in transcription errors.

## 2022-01-27 ENCOUNTER — OFFICE VISIT (OUTPATIENT)
Dept: OTHER | Facility: CLINIC | Age: 82
End: 2022-01-27
Attending: SURGERY
Payer: MEDICARE

## 2022-01-27 ENCOUNTER — HOSPITAL ENCOUNTER (OUTPATIENT)
Dept: ULTRASOUND IMAGING | Facility: CLINIC | Age: 82
End: 2022-01-27
Attending: SURGERY
Payer: MEDICARE

## 2022-01-27 VITALS — SYSTOLIC BLOOD PRESSURE: 150 MMHG | HEART RATE: 60 BPM | DIASTOLIC BLOOD PRESSURE: 70 MMHG

## 2022-01-27 DIAGNOSIS — I73.9 PAD (PERIPHERAL ARTERY DISEASE) (H): ICD-10-CM

## 2022-01-27 DIAGNOSIS — I89.0 LYMPHEDEMA OF BOTH LOWER EXTREMITIES: ICD-10-CM

## 2022-01-27 DIAGNOSIS — Z98.890 HISTORY OF LEFT-SIDED CAROTID ENDARTERECTOMY: ICD-10-CM

## 2022-01-27 DIAGNOSIS — I65.21 ASYMPTOMATIC STENOSIS OF RIGHT CAROTID ARTERY: ICD-10-CM

## 2022-01-27 DIAGNOSIS — E78.5 HYPERLIPIDEMIA LDL GOAL <70: ICD-10-CM

## 2022-01-27 DIAGNOSIS — Z98.890 HISTORY OF LEFT-SIDED CAROTID ENDARTERECTOMY: Primary | ICD-10-CM

## 2022-01-27 PROCEDURE — 99213 OFFICE O/P EST LOW 20 MIN: CPT | Performed by: SURGERY

## 2022-01-27 PROCEDURE — 93880 EXTRACRANIAL BILAT STUDY: CPT

## 2022-01-27 PROCEDURE — G0463 HOSPITAL OUTPT CLINIC VISIT: HCPCS | Mod: 25

## 2022-01-27 NOTE — PROGRESS NOTES
Doppler tracings per Dr. Mazariegos.        Tamera Mcmahon, BSN, RN-[facility] [place]

## 2022-01-27 NOTE — PROGRESS NOTES
[facility]        Patient is here for a  follow up.      Pt is currently taking Statin and Eliquis.    BP (!) 150/70 (BP Location: Right arm, Patient Position: Chair, Cuff Size: Adult Large)   Pulse 60   LMP  (LMP Unknown)   Breastfeeding No     The provider has been notified that the patient has no concerns.     Questions patient would like addressed today are: N/A.    Refills are needed: N/A    Has homecare services and agency name:  Elyssa Scanlon MA

## 2022-04-24 ENCOUNTER — HEALTH MAINTENANCE LETTER (OUTPATIENT)
Age: 82
End: 2022-04-24

## 2022-08-10 NOTE — PROGRESS NOTES
[place] [facility]ASCULAR [facility]    Narcisa Harris returns for carotid follow-up.  We have been following her for an asymptomatic left greater than right carotid stenosis.  9/9/2020 left CEA performed.  Left CEA widely patent on 1/27/2022 follow-up.  Calcified plaque at right carotid bifurcation with PSV= 259 cm/s (prior CTA with less than 50% stenosis 2020).      PMH: Medications: Lisinopril, Cozaar, Lopressor, Hygroton, Prilosec, Crestor,  aspirin   Medical: Hypertension    Hyperlipidemia on statin- 9/20/2021 LDL= 69    History of atrial fibrillation on chronic anticoagulation      (discontinued per patient)         GERD    PAD with decreased pedal pulses but adequate Doppler    History of elevated PTH    CKI-SCr= 1.58      ROS: Patient lives independently.  She has been vaccinated for COVID-19 and has not been ill but essentially the self isolating including [place] that she does online.  Has severe degenerative knee disease.  He has had a left knee replacement the right is worse and worse and she needs a walker to get around.  Does wear knee-high compression stockings at all times.    No neurological symptoms since her last visit.  Does check her blood pressure on a daily basis with the average of 140 to 160 systolic at home.  Non-smoker.  Her brother Shon Velazquez is a patient of ours.    Exam: Alert and appropriate.  Very pleasant and talkative.   Using a walker.   Blood pressure 175/74 right and 173/80 left.  Pulse 60 regular   HEENT= loud right carotid bruit.  No left bruit.    Well-healed left neck incision with full range of motion    Old scar right mid neck from a cyst as a child   Chest= clear   Cardiovascular= regular rate   Extremities= very heavyset legs.  Then an ankle and forefoot.    +3 right DP pulse.  Normal sensation.    Right carotid duplex performed today.  This reveals a worsening calcified stenosis at the carotid bifurcation.  ICA PSV= 341 cm/s with EDV= 130 cm/s.  Ratio 3.78 consistent with a  stenosis now greater than 70%.        All Impression: #1.  Progressive calcified stenosis of right carotid bifurcation.  This does increase her risk for stroke even on antiplatelet and statin medication.  With the progression I would recommend that she undergo elective right CEA.  She would like to wait till later September which should be fine but she is aware that there is a potential stroke that could occur but statistically low.  She had no questions about the surgeries that she is undergone a similar procedure on the left in 2020 with no difficulty.     #2.  Widely patent left CEA.     #3.  Overall good risk factor control.  Blood pressure is elevated today but has been normal at home and she should continue to monitor this.     #4.   Worsening disabling degenerative arthritis of her right knee.  He does have excellent circulation which is important if she does undergo knee replacement.  Does have very heavyset legs which does increase the risk of infection.  I have given her the name of Dr. Will Wiggins @ [facility] to contact since her original orthopedic surgeon who did her left knee has retired.    Patient will call to schedule the right carotid surgery after she discusses this with her son and family.      25 minutes with patient today.      Peter Mazariegos MD  This note was created using Dragon voice recognition software which may result in transcription errors.

## 2022-08-11 ENCOUNTER — HOSPITAL ENCOUNTER (OUTPATIENT)
Dept: ULTRASOUND IMAGING | Facility: CLINIC | Age: 82
Discharge: HOME | End: 2022-08-11
Attending: SURGERY
Payer: MEDICARE

## 2022-08-11 ENCOUNTER — OFFICE VISIT (OUTPATIENT)
Dept: OTHER | Facility: CLINIC | Age: 82
End: 2022-08-11
Attending: SURGERY
Payer: MEDICARE

## 2022-08-11 VITALS — HEART RATE: 60 BPM | DIASTOLIC BLOOD PRESSURE: 80 MMHG | SYSTOLIC BLOOD PRESSURE: 173 MMHG

## 2022-08-11 DIAGNOSIS — I65.21 ASYMPTOMATIC STENOSIS OF RIGHT CAROTID ARTERY: Primary | ICD-10-CM

## 2022-08-11 DIAGNOSIS — I65.21 ASYMPTOMATIC STENOSIS OF RIGHT CAROTID ARTERY: ICD-10-CM

## 2022-08-11 DIAGNOSIS — Z98.890 HISTORY OF LEFT-SIDED CAROTID ENDARTERECTOMY: ICD-10-CM

## 2022-08-11 PROCEDURE — 99214 OFFICE O/P EST MOD 30 MIN: CPT | Performed by: SURGERY

## 2022-08-11 PROCEDURE — G0463 HOSPITAL OUTPT CLINIC VISIT: HCPCS

## 2022-08-11 PROCEDURE — 93882 EXTRACRANIAL UNI/LTD STUDY: CPT | Mod: RT

## 2022-08-11 RX ORDER — LOSARTAN POTASSIUM 50 MG/1
25 TABLET ORAL
COMMUNITY
Start: 2020-09-17 | End: 2022-09-02

## 2022-08-11 NOTE — PROGRESS NOTES
[facility]        Patient is here for a  follow up.     Pt is currently taking Aspirin, Statin.     BP (!) 173/80 (BP Location: Left arm, Patient Position: Chair, Cuff Size: Adult Large)   Pulse 60   LMP  (LMP Unknown)   Breastfeeding No     The provider has been notified that the patient has no concerns.     Questions patient would like addressed today are: N/A.    Refills are needed: N/A    Has homecare services and agency name:  Elyssa Scanlon MA

## 2022-08-11 NOTE — NURSING NOTE
Written surgery order completed and placed in Dr. Mazariegos's TBD surgery folder.  Patient will call when she decides to pursue surgery.  Case request will need to be entered and preoperative education will need to be completed with patient.  LOREN GaytanN, RN-[facility] [place]

## 2022-08-18 ENCOUNTER — TELEPHONE (OUTPATIENT)
Dept: OTHER | Facility: CLINIC | Age: 82
End: 2022-08-18

## 2022-08-18 DIAGNOSIS — I65.21 CAROTID ARTERY STENOSIS, ASYMPTOMATIC, RIGHT: Primary | ICD-10-CM

## 2022-08-19 ENCOUNTER — TELEPHONE (OUTPATIENT)
Dept: OTHER | Facility: CLINIC | Age: 82
End: 2022-08-19

## 2022-08-19 NOTE — TELEPHONE ENCOUNTER
Patient Education completed with patient via phone.    Procedure: Right carotid endarterectomy with EEG  Diagnosis: Right carotid artery stenosis  Anticoagulation Instruction: Continue 81mg ASA  Pre-Operative Physical Exam: You need to have a pre-op physical exam within 30 days of your procedure. Your procedure may be cancelled if you do not have a current History and Physical. Call your PCP's office to schedule.  Allergies:  Updated in Epic  Bowel Prep: n/a  NPO per protocol.   Post Procedure Education: [facility] patient post-procedure fact sheet reviewed with patient.    COVID-19 testing for AM Admit procedures:    2-4 days before your procedure, please get a PCR test from a lab. To schedule a PCR test with [facility], call [phone]. Or, visit [email or web address].   Please ask the testing location to fax your results to us at [phone].     Once COVID test is obtained, pt to isolate to reduce risk of exposure up to date of surgery.    Showering instructions reviewed: Yes    Learner(s):patient  Method: Listening, Reading  Barriers to Learning:No Barrier  Outcome: Patient did verbalize understanding of above education.    Tamera Mcmahon, BSN, RN-BC  [facility] [place]

## 2022-08-19 NOTE — TELEPHONE ENCOUNTER
Case received for RIGHT CAROTID ENDARTERECTOMY WITH ELECTROENCEPHALOGRAM.     Case ID: [id number]      Citlaly Hobbs, Surgery Scheduling   [facility]

## 2022-08-22 NOTE — TELEPHONE ENCOUNTER
LM for pt to call back to discuss available dates/times for surgery.       Citlaly Hobbs, Surgery Scheduling   [facility]  [facility]

## 2022-08-23 NOTE — TELEPHONE ENCOUNTER
Spoke with pt to go over available dates/times for surgery.     Preferred date: ASAP  Dates to avoid: none    Patient would like to schedule ASAP.       Citlaly Hobbs, Surgery Scheduling   [facility]

## 2022-08-24 NOTE — TELEPHONE ENCOUNTER
Spoke with pt to go over surgery date/time, pre-op, covid testing, and post-op instructions.       Citlaly Hobbs, Surgery Scheduling   [facility]  Vascular [facility]

## 2022-08-24 NOTE — TELEPHONE ENCOUNTER
Type of surgery: RIGHT CAROTID ENDARTERECTOMY WITH ELECTROENCEPHALOGRAM   Location of surgery: [facility]  Date and time of surgery: 9/6/22 at 7:30am  Surgeon: Dr. Mazariegos  Pre-Op Appt Date: 9/2/22  Post-Op Appt Date: 9/22/22   Packet sent out: Yes, mailed  Pre-cert/Authorization completed:  Not Applicable  Date: 8/24/22      Citlaly Hobbs, Surgery Scheduling   [facility]

## 2022-09-02 ENCOUNTER — TRANSFERRED RECORDS (OUTPATIENT)
Dept: HEALTH INFORMATION MANAGEMENT | Facility: CLINIC | Age: 82
End: 2022-09-02

## 2022-09-02 LAB
CREATININE (EXTERNAL): 1.59 MG/DL (ref 0.57–1.11)
GFR ESTIMATED (EXTERNAL): 33 ML/MIN/1.73M2
GLUCOSE (EXTERNAL): 98 MG/DL (ref 65–100)
POTASSIUM (EXTERNAL): 5 MMOL/L (ref 3.5–5)

## 2022-09-02 RX ORDER — LEVOTHYROXINE SODIUM 25 UG/1
25 TABLET ORAL EVERY MORNING
COMMUNITY
End: 2022-09-22

## 2022-09-02 RX ORDER — HYDROCHLOROTHIAZIDE 25 MG/1
25 TABLET ORAL EVERY MORNING
COMMUNITY

## 2022-09-02 NOTE — PROGRESS NOTES
PTA medications updated by Medication Scribe prior to surgery via phone call with patient (last doses completed by Nurse)     Medication history sources: Patient, Surescripts and H&P  In the past week, patient estimated taking medication this percent of the time: Greater than 90%  Adherence assessment: N/A Not Observed    Significant changes made to the medication list:  Patient reports no longer taking the following meds (med scribe removed from PTA med list): Eliquis, Fe, chlorthalidone, lisinopril, omeprazole      Additional medication history information:   None    Medication reconciliation completed by provider prior to medication history? No    Time spent in this activity: 60 minutes    The information provided in this note is only as accurate as the sources available at the time of update(s)      Prior to Admission medications    Medication Sig Last Dose Taking? Auth Provider Long Term End Date   aspirin 81 MG tablet Take 1 tablet by mouth daily.  at AM Yes Reported, Patient     hydrochlorothiazide (HYDRODIURIL) 25 MG tablet Take 25 mg by mouth every morning  at AM Yes Reported, Patient No    levothyroxine (SYNTHROID/LEVOTHROID) 25 MCG tablet Take 25 mcg by mouth every morning  at AM Yes Reported, Patient Yes    losartan (COZAAR) 50 MG tablet Take 50 mg by mouth every morning  at AM Yes Reported, Patient No    metoprolol tartrate (LOPRESSOR) 25 MG tablet Take 25 mg by mouth 2 times daily  at AM Yes Reported, Patient Yes    rosuvastatin (CRESTOR) 10 MG tablet Take 10 mg by mouth every other day  at HS Yes Reported, Patient Yes

## 2022-09-05 ENCOUNTER — TELEPHONE (OUTPATIENT)
Dept: OTHER | Facility: CLINIC | Age: 82
End: 2022-09-05

## 2022-09-05 NOTE — TELEPHONE ENCOUNTER
[facility]    I called Narcisa Harris about her left CEA tomorrow morning.  In the past is undergone a right CEA and is done very well and we have been following a progressively worsening stenosis now greater than 70% remains asymptomatic.  She had no questions about the procedure tomorrow.    9/3/2022 laboratory: Sodium= 142 K= 5.0 Scr= 1.59 (chronic) glucose= 98    COVID-19 testing negative on 9/2/2022.  Fully vaccinated.    May 2022 LDL= 144.  We will have this rechecked tomorrow morning.      Peter Mazariegos MD

## 2022-09-06 ENCOUNTER — ANESTHESIA EVENT (OUTPATIENT)
Dept: SURGERY | Facility: CLINIC | Age: 82
DRG: 038 | End: 2022-09-06
Payer: MEDICARE

## 2022-09-06 ENCOUNTER — ANESTHESIA (OUTPATIENT)
Dept: SURGERY | Facility: CLINIC | Age: 82
DRG: 038 | End: 2022-09-06
Payer: MEDICARE

## 2022-09-06 ENCOUNTER — HOSPITAL ENCOUNTER (INPATIENT)
Facility: CLINIC | Age: 82
LOS: 2 days | Discharge: HOME | DRG: 038 | End: 2022-09-08
Attending: SURGERY | Admitting: SURGERY
Payer: MEDICARE

## 2022-09-06 ENCOUNTER — OFFICE VISIT (OUTPATIENT)
Dept: SURGERY | Facility: PHYSICIAN GROUP | Age: 82
End: 2022-09-06
Payer: MEDICARE

## 2022-09-06 DIAGNOSIS — I65.21 CAROTID STENOSIS, ASYMPTOMATIC, RIGHT: Primary | ICD-10-CM

## 2022-09-06 DIAGNOSIS — E78.2 MIXED HYPERLIPIDEMIA: ICD-10-CM

## 2022-09-06 DIAGNOSIS — I65.21 STENOSIS OF RIGHT CAROTID ARTERY: Primary | ICD-10-CM

## 2022-09-06 DIAGNOSIS — I48.0 PAROXYSMAL ATRIAL FIBRILLATION (H): ICD-10-CM

## 2022-09-06 LAB
CHOLEST SERPL-MCNC: 210 MG/DL
CREAT SERPL-MCNC: 1.56 MG/DL (ref 0.52–1.04)
GFR SERPL CREATININE-BSD FRML MDRD: 33 ML/MIN/1.73M2
HBA1C MFR BLD: 5.7 % (ref 0–5.6)
HDLC SERPL-MCNC: 49 MG/DL
HGB BLD-MCNC: 12.1 G/DL (ref 11.7–15.7)
LDLC SERPL CALC-MCNC: 133 MG/DL
NONHDLC SERPL-MCNC: 161 MG/DL
PLATELET # BLD AUTO: 281 10E3/UL (ref 150–450)
POTASSIUM BLD-SCNC: 4.5 MMOL/L (ref 3.4–5.3)
TRIGL SERPL-MCNC: 140 MG/DL

## 2022-09-06 PROCEDURE — 272N000282 HC OR IOM SUPPLIES OPNP: Performed by: SURGERY

## 2022-09-06 PROCEDURE — 250N000011 HC RX IP 250 OP 636: Performed by: SURGERY

## 2022-09-06 PROCEDURE — 120N000013 HC R&B IMCU

## 2022-09-06 PROCEDURE — 85049 AUTOMATED PLATELET COUNT: CPT | Performed by: SURGERY

## 2022-09-06 PROCEDURE — 83036 HEMOGLOBIN GLYCOSYLATED A1C: CPT | Performed by: SURGERY

## 2022-09-06 PROCEDURE — 250N000009 HC RX 250: Performed by: ANESTHESIOLOGY

## 2022-09-06 PROCEDURE — 93010 ELECTROCARDIOGRAM REPORT: CPT | Performed by: INTERNAL MEDICINE

## 2022-09-06 PROCEDURE — 36415 COLL VENOUS BLD VENIPUNCTURE: CPT | Performed by: SURGERY

## 2022-09-06 PROCEDURE — 03UM07Z SUPPLEMENT RIGHT EXTERNAL CAROTID ARTERY WITH AUTOLOGOUS TISSUE SUBSTITUTE, OPEN APPROACH: ICD-10-PCS | Performed by: SURGERY

## 2022-09-06 PROCEDURE — 82565 ASSAY OF CREATININE: CPT | Performed by: ANESTHESIOLOGY

## 2022-09-06 PROCEDURE — 03CM0ZZ EXTIRPATION OF MATTER FROM RIGHT EXTERNAL CAROTID ARTERY, OPEN APPROACH: ICD-10-PCS | Performed by: SURGERY

## 2022-09-06 PROCEDURE — 258N000003 HC RX IP 258 OP 636: Performed by: SURGERY

## 2022-09-06 PROCEDURE — 922N000001 HC NEURO MONITORING SERVICE, UP TO 7 HOURS (T1FEE): Performed by: SURGERY

## 2022-09-06 PROCEDURE — 258N000003 HC RX IP 258 OP 636: Performed by: ANESTHESIOLOGY

## 2022-09-06 PROCEDURE — 250N000011 HC RX IP 250 OP 636: Performed by: ANESTHESIOLOGY

## 2022-09-06 PROCEDURE — 93005 ELECTROCARDIOGRAM TRACING: CPT

## 2022-09-06 PROCEDURE — 999N000141 HC STATISTIC PRE-PROCEDURE NURSING ASSESSMENT: Performed by: SURGERY

## 2022-09-06 PROCEDURE — 250N000013 HC RX MED GY IP 250 OP 250 PS 637: Performed by: SURGERY

## 2022-09-06 PROCEDURE — 85018 HEMOGLOBIN: CPT | Performed by: ANESTHESIOLOGY

## 2022-09-06 PROCEDURE — 250N000025 HC SEVOFLURANE, PER MIN: Performed by: SURGERY

## 2022-09-06 PROCEDURE — 35301 RECHANNELING OF ARTERY: CPT | Mod: RT | Performed by: SURGERY

## 2022-09-06 PROCEDURE — 250N000009 HC RX 250: Performed by: SURGERY

## 2022-09-06 PROCEDURE — 80061 LIPID PANEL: CPT | Performed by: SURGERY

## 2022-09-06 PROCEDURE — 360N000077 HC SURGERY LEVEL 4, PER MIN: Performed by: SURGERY

## 2022-09-06 PROCEDURE — 370N000017 HC ANESTHESIA TECHNICAL FEE, PER MIN: Performed by: SURGERY

## 2022-09-06 PROCEDURE — 84132 ASSAY OF SERUM POTASSIUM: CPT | Performed by: ANESTHESIOLOGY

## 2022-09-06 PROCEDURE — 05BT0ZZ EXCISION OF RIGHT FACE VEIN, OPEN APPROACH: ICD-10-PCS | Performed by: SURGERY

## 2022-09-06 PROCEDURE — 272N000001 HC OR GENERAL SUPPLY STERILE: Performed by: SURGERY

## 2022-09-06 PROCEDURE — 710N000010 HC RECOVERY PHASE 1, LEVEL 2, PER MIN: Performed by: SURGERY

## 2022-09-06 RX ORDER — NALOXONE HYDROCHLORIDE 0.4 MG/ML
0.4 INJECTION, SOLUTION INTRAMUSCULAR; INTRAVENOUS; SUBCUTANEOUS
Status: DISCONTINUED | OUTPATIENT
Start: 2022-09-06 | End: 2022-09-08 | Stop reason: HOSPADM

## 2022-09-06 RX ORDER — CALCIUM CARBONATE 500 MG/1
500 TABLET, CHEWABLE ORAL 4 TIMES DAILY PRN
Status: DISCONTINUED | OUTPATIENT
Start: 2022-09-06 | End: 2022-09-08 | Stop reason: HOSPADM

## 2022-09-06 RX ORDER — POLYETHYLENE GLYCOL 3350 17 G/17G
17 POWDER, FOR SOLUTION ORAL DAILY
Status: DISCONTINUED | OUTPATIENT
Start: 2022-09-07 | End: 2022-09-08 | Stop reason: HOSPADM

## 2022-09-06 RX ORDER — PROCHLORPERAZINE MALEATE 5 MG
5 TABLET ORAL EVERY 6 HOURS PRN
Status: DISCONTINUED | OUTPATIENT
Start: 2022-09-06 | End: 2022-09-07

## 2022-09-06 RX ORDER — ASPIRIN 81 MG/1
162 TABLET, CHEWABLE ORAL
Status: COMPLETED | OUTPATIENT
Start: 2022-09-06 | End: 2022-09-06

## 2022-09-06 RX ORDER — BUPIVACAINE HYDROCHLORIDE 5 MG/ML
INJECTION, SOLUTION EPIDURAL; INTRACAUDAL
Status: DISCONTINUED
Start: 2022-09-06 | End: 2022-09-06 | Stop reason: HOSPADM

## 2022-09-06 RX ORDER — HEPARIN SODIUM 1000 [USP'U]/ML
INJECTION, SOLUTION INTRAVENOUS; SUBCUTANEOUS PRN
Status: DISCONTINUED | OUTPATIENT
Start: 2022-09-06 | End: 2022-09-06 | Stop reason: HOSPADM

## 2022-09-06 RX ORDER — SODIUM CHLORIDE 9 MG/ML
INJECTION, SOLUTION INTRAVENOUS CONTINUOUS
Status: DISCONTINUED | OUTPATIENT
Start: 2022-09-06 | End: 2022-09-07

## 2022-09-06 RX ORDER — BUPIVACAINE HYDROCHLORIDE 5 MG/ML
INJECTION, SOLUTION PERINEURAL PRN
Status: DISCONTINUED | OUTPATIENT
Start: 2022-09-06 | End: 2022-09-06 | Stop reason: HOSPADM

## 2022-09-06 RX ORDER — OXYCODONE HYDROCHLORIDE 5 MG/1
5 TABLET ORAL EVERY 4 HOURS PRN
Status: DISCONTINUED | OUTPATIENT
Start: 2022-09-06 | End: 2022-09-08 | Stop reason: HOSPADM

## 2022-09-06 RX ORDER — LIDOCAINE 40 MG/G
CREAM TOPICAL
Status: DISCONTINUED | OUTPATIENT
Start: 2022-09-06 | End: 2022-09-08 | Stop reason: HOSPADM

## 2022-09-06 RX ORDER — CEFAZOLIN SODIUM/WATER 2 G/20 ML
2 SYRINGE (ML) INTRAVENOUS
Status: COMPLETED | OUTPATIENT
Start: 2022-09-06 | End: 2022-09-06

## 2022-09-06 RX ORDER — ONDANSETRON 4 MG/1
4 TABLET, ORALLY DISINTEGRATING ORAL EVERY 30 MIN PRN
Status: DISCONTINUED | OUTPATIENT
Start: 2022-09-06 | End: 2022-09-06 | Stop reason: HOSPADM

## 2022-09-06 RX ORDER — NALOXONE HYDROCHLORIDE 0.4 MG/ML
0.2 INJECTION, SOLUTION INTRAMUSCULAR; INTRAVENOUS; SUBCUTANEOUS
Status: DISCONTINUED | OUTPATIENT
Start: 2022-09-06 | End: 2022-09-08 | Stop reason: HOSPADM

## 2022-09-06 RX ORDER — ONDANSETRON 2 MG/ML
4 INJECTION INTRAMUSCULAR; INTRAVENOUS EVERY 30 MIN PRN
Status: DISCONTINUED | OUTPATIENT
Start: 2022-09-06 | End: 2022-09-06 | Stop reason: HOSPADM

## 2022-09-06 RX ORDER — ACETAMINOPHEN 325 MG/1
975 TABLET ORAL EVERY 8 HOURS
Status: DISCONTINUED | OUTPATIENT
Start: 2022-09-06 | End: 2022-09-08 | Stop reason: HOSPADM

## 2022-09-06 RX ORDER — ASPIRIN 81 MG/1
81 TABLET, CHEWABLE ORAL
Status: COMPLETED | OUTPATIENT
Start: 2022-09-06 | End: 2022-09-06

## 2022-09-06 RX ORDER — FENTANYL CITRATE 50 UG/ML
INJECTION, SOLUTION INTRAMUSCULAR; INTRAVENOUS PRN
Status: DISCONTINUED | OUTPATIENT
Start: 2022-09-06 | End: 2022-09-06

## 2022-09-06 RX ORDER — LEVOTHYROXINE SODIUM 25 UG/1
25 TABLET ORAL EVERY MORNING
Status: DISCONTINUED | OUTPATIENT
Start: 2022-09-07 | End: 2022-09-08 | Stop reason: HOSPADM

## 2022-09-06 RX ORDER — HYDROCHLOROTHIAZIDE 25 MG/1
25 TABLET ORAL EVERY MORNING
Status: DISCONTINUED | OUTPATIENT
Start: 2022-09-07 | End: 2022-09-08 | Stop reason: HOSPADM

## 2022-09-06 RX ORDER — AMOXICILLIN 250 MG
1 CAPSULE ORAL 2 TIMES DAILY
Status: DISCONTINUED | OUTPATIENT
Start: 2022-09-06 | End: 2022-09-08 | Stop reason: HOSPADM

## 2022-09-06 RX ORDER — ROSUVASTATIN CALCIUM 5 MG/1
10 TABLET, COATED ORAL EVERY EVENING
Status: DISCONTINUED | OUTPATIENT
Start: 2022-09-06 | End: 2022-09-08

## 2022-09-06 RX ORDER — LABETALOL HYDROCHLORIDE 5 MG/ML
10 INJECTION, SOLUTION INTRAVENOUS EVERY 10 MIN PRN
Status: DISCONTINUED | OUTPATIENT
Start: 2022-09-06 | End: 2022-09-08 | Stop reason: HOSPADM

## 2022-09-06 RX ORDER — LABETALOL HYDROCHLORIDE 5 MG/ML
10 INJECTION, SOLUTION INTRAVENOUS ONCE
Status: DISCONTINUED | OUTPATIENT
Start: 2022-09-06 | End: 2022-09-06 | Stop reason: HOSPADM

## 2022-09-06 RX ORDER — MAGNESIUM HYDROXIDE 1200 MG/15ML
LIQUID ORAL PRN
Status: DISCONTINUED | OUTPATIENT
Start: 2022-09-06 | End: 2022-09-06 | Stop reason: HOSPADM

## 2022-09-06 RX ORDER — ONDANSETRON 2 MG/ML
4 INJECTION INTRAMUSCULAR; INTRAVENOUS EVERY 6 HOURS PRN
Status: DISCONTINUED | OUTPATIENT
Start: 2022-09-06 | End: 2022-09-08 | Stop reason: HOSPADM

## 2022-09-06 RX ORDER — OXYCODONE HYDROCHLORIDE 5 MG/1
5 TABLET ORAL EVERY 4 HOURS PRN
Status: DISCONTINUED | OUTPATIENT
Start: 2022-09-06 | End: 2022-09-06 | Stop reason: HOSPADM

## 2022-09-06 RX ORDER — ASPIRIN 81 MG/1
81 TABLET ORAL DAILY
Status: DISCONTINUED | OUTPATIENT
Start: 2022-09-07 | End: 2022-09-08 | Stop reason: HOSPADM

## 2022-09-06 RX ORDER — DEXMEDETOMIDINE HYDROCHLORIDE 4 UG/ML
INJECTION, SOLUTION INTRAVENOUS PRN
Status: DISCONTINUED | OUTPATIENT
Start: 2022-09-06 | End: 2022-09-06

## 2022-09-06 RX ORDER — GABAPENTIN 100 MG/1
100 CAPSULE ORAL AT BEDTIME
Status: DISCONTINUED | OUTPATIENT
Start: 2022-09-06 | End: 2022-09-08 | Stop reason: HOSPADM

## 2022-09-06 RX ORDER — SODIUM CHLORIDE, SODIUM LACTATE, POTASSIUM CHLORIDE, CALCIUM CHLORIDE 600; 310; 30; 20 MG/100ML; MG/100ML; MG/100ML; MG/100ML
INJECTION, SOLUTION INTRAVENOUS CONTINUOUS
Status: DISCONTINUED | OUTPATIENT
Start: 2022-09-06 | End: 2022-09-06 | Stop reason: HOSPADM

## 2022-09-06 RX ORDER — ONDANSETRON 4 MG/1
4 TABLET, ORALLY DISINTEGRATING ORAL EVERY 6 HOURS PRN
Status: DISCONTINUED | OUTPATIENT
Start: 2022-09-06 | End: 2022-09-08 | Stop reason: HOSPADM

## 2022-09-06 RX ORDER — LIDOCAINE HYDROCHLORIDE 10 MG/ML
INJECTION, SOLUTION INFILTRATION; PERINEURAL
Status: DISCONTINUED
Start: 2022-09-06 | End: 2022-09-06 | Stop reason: HOSPADM

## 2022-09-06 RX ORDER — HEPARIN SODIUM 5000 [USP'U]/.5ML
5000 INJECTION, SOLUTION INTRAVENOUS; SUBCUTANEOUS EVERY 8 HOURS
Status: DISCONTINUED | OUTPATIENT
Start: 2022-09-07 | End: 2022-09-08 | Stop reason: HOSPADM

## 2022-09-06 RX ORDER — HEPARIN SODIUM 1000 [USP'U]/ML
INJECTION, SOLUTION INTRAVENOUS; SUBCUTANEOUS
Status: DISCONTINUED
Start: 2022-09-06 | End: 2022-09-06 | Stop reason: HOSPADM

## 2022-09-06 RX ORDER — BISACODYL 10 MG
10 SUPPOSITORY, RECTAL RECTAL DAILY PRN
Status: DISCONTINUED | OUTPATIENT
Start: 2022-09-06 | End: 2022-09-08 | Stop reason: HOSPADM

## 2022-09-06 RX ORDER — LEVOTHYROXINE SODIUM 25 UG/1
25 TABLET ORAL
COMMUNITY
Start: 2021-04-20

## 2022-09-06 RX ORDER — NEOSTIGMINE METHYLSULFATE 1 MG/ML
VIAL (ML) INJECTION PRN
Status: DISCONTINUED | OUTPATIENT
Start: 2022-09-06 | End: 2022-09-06

## 2022-09-06 RX ORDER — DEXAMETHASONE SODIUM PHOSPHATE 4 MG/ML
INJECTION, SOLUTION INTRA-ARTICULAR; INTRALESIONAL; INTRAMUSCULAR; INTRAVENOUS; SOFT TISSUE PRN
Status: DISCONTINUED | OUTPATIENT
Start: 2022-09-06 | End: 2022-09-06

## 2022-09-06 RX ORDER — NITROGLYCERIN 10 MG/100ML
INJECTION INTRAVENOUS PRN
Status: DISCONTINUED | OUTPATIENT
Start: 2022-09-06 | End: 2022-09-06

## 2022-09-06 RX ORDER — ACETAMINOPHEN 325 MG/1
975 TABLET ORAL ONCE
Status: COMPLETED | OUTPATIENT
Start: 2022-09-06 | End: 2022-09-06

## 2022-09-06 RX ORDER — ONDANSETRON 2 MG/ML
INJECTION INTRAMUSCULAR; INTRAVENOUS PRN
Status: DISCONTINUED | OUTPATIENT
Start: 2022-09-06 | End: 2022-09-06

## 2022-09-06 RX ORDER — HYDROMORPHONE HCL IN WATER/PF 6 MG/30 ML
0.2 PATIENT CONTROLLED ANALGESIA SYRINGE INTRAVENOUS
Status: DISCONTINUED | OUTPATIENT
Start: 2022-09-06 | End: 2022-09-08 | Stop reason: HOSPADM

## 2022-09-06 RX ORDER — HEPARIN SODIUM 1000 [USP'U]/ML
INJECTION, SOLUTION INTRAVENOUS; SUBCUTANEOUS PRN
Status: DISCONTINUED | OUTPATIENT
Start: 2022-09-06 | End: 2022-09-06

## 2022-09-06 RX ORDER — HYDROMORPHONE HYDROCHLORIDE 1 MG/ML
INJECTION, SOLUTION INTRAMUSCULAR; INTRAVENOUS; SUBCUTANEOUS PRN
Status: DISCONTINUED | OUTPATIENT
Start: 2022-09-06 | End: 2022-09-06

## 2022-09-06 RX ORDER — LOSARTAN POTASSIUM 50 MG/1
50 TABLET ORAL EVERY MORNING
Status: DISCONTINUED | OUTPATIENT
Start: 2022-09-07 | End: 2022-09-08 | Stop reason: HOSPADM

## 2022-09-06 RX ORDER — LIDOCAINE HYDROCHLORIDE 20 MG/ML
INJECTION, SOLUTION INFILTRATION; PERINEURAL PRN
Status: DISCONTINUED | OUTPATIENT
Start: 2022-09-06 | End: 2022-09-06

## 2022-09-06 RX ORDER — HYDROMORPHONE HCL IN WATER/PF 6 MG/30 ML
0.2 PATIENT CONTROLLED ANALGESIA SYRINGE INTRAVENOUS EVERY 5 MIN PRN
Status: DISCONTINUED | OUTPATIENT
Start: 2022-09-06 | End: 2022-09-06 | Stop reason: HOSPADM

## 2022-09-06 RX ORDER — FENTANYL CITRATE 0.05 MG/ML
25 INJECTION, SOLUTION INTRAMUSCULAR; INTRAVENOUS EVERY 5 MIN PRN
Status: DISCONTINUED | OUTPATIENT
Start: 2022-09-06 | End: 2022-09-06 | Stop reason: HOSPADM

## 2022-09-06 RX ORDER — ACETAMINOPHEN 325 MG/1
650 TABLET ORAL EVERY 4 HOURS PRN
Status: DISCONTINUED | OUTPATIENT
Start: 2022-09-09 | End: 2022-09-08 | Stop reason: HOSPADM

## 2022-09-06 RX ORDER — PROPOFOL 10 MG/ML
INJECTION, EMULSION INTRAVENOUS PRN
Status: DISCONTINUED | OUTPATIENT
Start: 2022-09-06 | End: 2022-09-06

## 2022-09-06 RX ORDER — EPHEDRINE SULFATE 50 MG/ML
INJECTION, SOLUTION INTRAMUSCULAR; INTRAVENOUS; SUBCUTANEOUS PRN
Status: DISCONTINUED | OUTPATIENT
Start: 2022-09-06 | End: 2022-09-06

## 2022-09-06 RX ORDER — GLYCOPYRROLATE 0.2 MG/ML
INJECTION, SOLUTION INTRAMUSCULAR; INTRAVENOUS PRN
Status: DISCONTINUED | OUTPATIENT
Start: 2022-09-06 | End: 2022-09-06

## 2022-09-06 RX ORDER — METOPROLOL TARTRATE 25 MG/1
25 TABLET, FILM COATED ORAL 2 TIMES DAILY
Status: DISCONTINUED | OUTPATIENT
Start: 2022-09-06 | End: 2022-09-08 | Stop reason: HOSPADM

## 2022-09-06 RX ADMIN — Medication 5 MG: at 07:48

## 2022-09-06 RX ADMIN — HEPARIN SODIUM 5000 UNITS: 1000 INJECTION INTRAVENOUS; SUBCUTANEOUS at 08:48

## 2022-09-06 RX ADMIN — LIDOCAINE HYDROCHLORIDE 40 MG: 20 INJECTION, SOLUTION INFILTRATION; PERINEURAL at 10:15

## 2022-09-06 RX ADMIN — Medication 2 G: at 07:44

## 2022-09-06 RX ADMIN — DEXMEDETOMIDINE HYDROCHLORIDE 4 MCG: 200 INJECTION INTRAVENOUS at 09:54

## 2022-09-06 RX ADMIN — GLYCOPYRROLATE 0.2 MG: 0.2 INJECTION, SOLUTION INTRAMUSCULAR; INTRAVENOUS at 07:52

## 2022-09-06 RX ADMIN — DEXMEDETOMIDINE HYDROCHLORIDE 4 MCG: 200 INJECTION INTRAVENOUS at 09:49

## 2022-09-06 RX ADMIN — NITROGLYCERIN 10 MCG: 10 INJECTION INTRAVENOUS at 10:40

## 2022-09-06 RX ADMIN — DEXMEDETOMIDINE HYDROCHLORIDE 4 MCG: 200 INJECTION INTRAVENOUS at 09:41

## 2022-09-06 RX ADMIN — PROPOFOL 80 MG: 10 INJECTION, EMULSION INTRAVENOUS at 07:37

## 2022-09-06 RX ADMIN — ACETAMINOPHEN 975 MG: 325 TABLET ORAL at 13:55

## 2022-09-06 RX ADMIN — REMIFENTANIL HYDROCHLORIDE 0.2 MCG/KG/MIN: 1 INJECTION, POWDER, LYOPHILIZED, FOR SOLUTION INTRAVENOUS at 07:40

## 2022-09-06 RX ADMIN — DEXMEDETOMIDINE HYDROCHLORIDE 4 MCG: 200 INJECTION INTRAVENOUS at 10:01

## 2022-09-06 RX ADMIN — NITROGLYCERIN 10 MCG: 10 INJECTION INTRAVENOUS at 10:43

## 2022-09-06 RX ADMIN — DEXAMETHASONE SODIUM PHOSPHATE 4 MG: 4 INJECTION, SOLUTION INTRA-ARTICULAR; INTRALESIONAL; INTRAMUSCULAR; INTRAVENOUS; SOFT TISSUE at 07:57

## 2022-09-06 RX ADMIN — NITROGLYCERIN 10 MCG: 10 INJECTION INTRAVENOUS at 10:35

## 2022-09-06 RX ADMIN — NITROGLYCERIN 10 MCG: 10 INJECTION INTRAVENOUS at 10:45

## 2022-09-06 RX ADMIN — PHENYLEPHRINE HYDROCHLORIDE 50 MCG: 10 INJECTION INTRAVENOUS at 08:52

## 2022-09-06 RX ADMIN — HYDROMORPHONE HYDROCHLORIDE 0.25 MG: 1 INJECTION, SOLUTION INTRAMUSCULAR; INTRAVENOUS; SUBCUTANEOUS at 10:10

## 2022-09-06 RX ADMIN — DEXMEDETOMIDINE HYDROCHLORIDE 4 MCG: 200 INJECTION INTRAVENOUS at 10:08

## 2022-09-06 RX ADMIN — LIDOCAINE HYDROCHLORIDE 100 MG: 20 INJECTION, SOLUTION INFILTRATION; PERINEURAL at 07:37

## 2022-09-06 RX ADMIN — ASPIRIN 81 MG CHEWABLE TABLET 81 MG: 81 TABLET CHEWABLE at 06:21

## 2022-09-06 RX ADMIN — GABAPENTIN 100 MG: 100 CAPSULE ORAL at 22:17

## 2022-09-06 RX ADMIN — OXYCODONE HYDROCHLORIDE 5 MG: 5 TABLET ORAL at 18:23

## 2022-09-06 RX ADMIN — LABETALOL HYDROCHLORIDE 10 MG: 5 INJECTION, SOLUTION INTRAVENOUS at 14:08

## 2022-09-06 RX ADMIN — NEOSTIGMINE METHYLSULFATE 5 MG: 1 INJECTION, SOLUTION INTRAVENOUS at 10:08

## 2022-09-06 RX ADMIN — ONDANSETRON 4 MG: 2 INJECTION INTRAMUSCULAR; INTRAVENOUS at 10:08

## 2022-09-06 RX ADMIN — SODIUM CHLORIDE, POTASSIUM CHLORIDE, SODIUM LACTATE AND CALCIUM CHLORIDE: 600; 310; 30; 20 INJECTION, SOLUTION INTRAVENOUS at 06:21

## 2022-09-06 RX ADMIN — METOPROLOL TARTRATE 25 MG: 25 TABLET, FILM COATED ORAL at 20:34

## 2022-09-06 RX ADMIN — SODIUM CHLORIDE: 9 INJECTION, SOLUTION INTRAVENOUS at 13:22

## 2022-09-06 RX ADMIN — LABETALOL HYDROCHLORIDE 10 MG: 5 INJECTION, SOLUTION INTRAVENOUS at 18:15

## 2022-09-06 RX ADMIN — ROCURONIUM BROMIDE 30 MG: 50 INJECTION, SOLUTION INTRAVENOUS at 07:37

## 2022-09-06 RX ADMIN — FENTANYL CITRATE 100 MCG: 50 INJECTION, SOLUTION INTRAMUSCULAR; INTRAVENOUS at 07:37

## 2022-09-06 RX ADMIN — GLYCOPYRROLATE 0.8 MG: 0.2 INJECTION, SOLUTION INTRAMUSCULAR; INTRAVENOUS at 10:08

## 2022-09-06 RX ADMIN — PHENYLEPHRINE HYDROCHLORIDE 0.5 MCG/KG/MIN: 10 INJECTION INTRAVENOUS at 07:44

## 2022-09-06 RX ADMIN — GLYCOPYRROLATE 0.2 MG: 0.2 INJECTION, SOLUTION INTRAMUSCULAR; INTRAVENOUS at 07:46

## 2022-09-06 RX ADMIN — ACETAMINOPHEN 975 MG: 325 TABLET ORAL at 20:33

## 2022-09-06 RX ADMIN — SODIUM CHLORIDE, POTASSIUM CHLORIDE, SODIUM LACTATE AND CALCIUM CHLORIDE: 600; 310; 30; 20 INJECTION, SOLUTION INTRAVENOUS at 10:25

## 2022-09-06 RX ADMIN — PHENYLEPHRINE HYDROCHLORIDE 100 MCG: 10 INJECTION INTRAVENOUS at 07:46

## 2022-09-06 RX ADMIN — ACETAMINOPHEN 975 MG: 325 TABLET ORAL at 06:22

## 2022-09-06 RX ADMIN — LABETALOL HYDROCHLORIDE 10 MG: 5 INJECTION, SOLUTION INTRAVENOUS at 22:17

## 2022-09-06 ASSESSMENT — ACTIVITIES OF DAILY LIVING (ADL)
ADLS_ACUITY_SCORE: 36
ADLS_ACUITY_SCORE: 35
ADLS_ACUITY_SCORE: 33
ADLS_ACUITY_SCORE: 36
ADLS_ACUITY_SCORE: 36
ADLS_ACUITY_SCORE: 35

## 2022-09-06 ASSESSMENT — ENCOUNTER SYMPTOMS: DYSRHYTHMIAS: 1

## 2022-09-06 ASSESSMENT — LIFESTYLE VARIABLES: TOBACCO_USE: 0

## 2022-09-06 NOTE — OP NOTE
Procedure Date: 09/06/2022    PREOPERATIVE DIAGNOSIS:  Progressive high-grade asymptomatic calcified right internal carotid artery stenosis.    POSTOPERATIVE DIAGNOSIS:  Progressive high-grade asymptomatic calcified right internal carotid artery stenosis.    OPERATIVE PROCEDURE:  Right carotid endarterectomy with distal facial vein patch angioplasty.   A.  Intraoperative shunting and EEG monitoring.    SURGEON:  Peter Mazariegos M.D.     FIRST ASSISTANT:  Pretty Brooks MD (Vascular Fellow).    ANESTHESIA:  General.    MEDICATIONS:  Ancef 2 grams IV.    INDICATIONS FOR PROCEDURE:  An 82-year-old patient has undergone a successful left carotid endarterectomy.  We have been following for progressively increasing asymptomatic calcified right carotid bifurcation disease, which is now greater than 70%.  She comes to the operating room today under informed consent.    DESCRIPTION OF PROCEDURE:  The patient was brought to the operating room, induced under general anesthesia, orally intubated without difficulty.  She is morbidly obese, but her neck is relatively thin.  Roll towel was placed under shoulders.  Calf pneumatic compression boots were used and pillows placed under knees.  Right neck area was prepped and draped.  Timeout was called and the sites were identified.    VASCULAR EXPOSURE:  Standard right carotid incision was made.  Dissection was carried down to the platysmas.  We did not identify a dominant external jugular vein.  Sternocleidomastoid muscle was retracted laterally.  We dissected down to the carotid sheath.  The internal jugular vein was identified.  She had a facial vein that was very adequate, but had multiple small branches, which were ligated between 7-0 Prolene suture.  Vein was then ligated at its confluence with the internal jugular vein and controlled with a vessel loop to avoid any trauma.    Ansa cervicalis and hypoglossal nerves were easily visualized and mobilized with no difficulty.  She  had a normal level of the bifurcation, and therefore very minimal manipulation was necessary on the well-identified hypoglossal nerve.  Common carotid artery measured approximately 7 mm in diameter, had some mild disease.  There was disease in the origin of the external carotid artery, but none distally.  Approximately 2.5 cm beyond the ICA origin, the vessel was completely free of disease and this was encircled with a Silastic vessel loop.    CAROTID ENTERECTOMY:  5000 units of intravenous heparin were given.  After 5 minutes, the vessel was sequentially tightened with a stable EEG and blood pressure.  An 11 blade was used in the common carotid.  Penn scissors extended the arteriotomy to the distal ICA.  A preheparinized Sundt shunt was inserted and secured with vessel loop proximally and Junaid clamp distally.  Distal endpoint in the ICA was made with a Canyon blade and loupe magnification.  Endarterectomy was performed of the deep media.  CC endpoint measured less than a millimeter in thickness.  Excellent eversion endarterectomy of the external carotid artery.  Good backbleeding.  All loose medial fibers were removed down to a very smooth plane.  Distal ICA endpoint had a very minimal transition and this was tacked down very nicely with interrupted 7-0 Prolene suture with loupe magnification.    DISTAL FACIAL VEIN PATCH:  We then harvested the prepared facial vein from the neck.  This was gently dilated with 0.5% Marcaine.  This was everted and left normal direction of flow.  The valve leaflet was cut and this was sewn from the endpoint in the ICA to the carotid bulb with running 6-0 Prolene suture.  We then closed the more proximal common carotid with running 6-0 Prolene sutures, essentially with very adequate luminal diameter.  The vessel was flushed with heparinized saline solution.  The shunt was removed and blood flow sequentially allowed to go up the ICA.  A single 6-0 mattress suture was placed on our  vein patch close to the ECA for good hemostasis with a strong pulse being noted, good hemostasis, and a very stable EEG.    Patch measured 4 cm in length and the arteriotomy 2.5 cm on the common carotid.  Small amount of Surgicel was placed over this.  Neck was closed in layers with interrupted running 3-0 Vicryl suture.  The mandibular cutaneous nerve was not identified.  Skin was closed with 4-0 Monocryl in subcuticular fashion followed by Steri-Strips after infiltrating the area with 0.5% Marcaine for postoperative analgesia.  Toradol was not used due to her renal insufficiency.    The patient awoke upon the operating table, was extubated and returned to the recovery room in satisfactory condition, neurologically intact.    Peter Mazariegos MD        D: 2022   T: 2022   MT: [id number]    Name:     EDWIN LEWIS  MRN:      [id number]        Account:        [id number]   :      1940           Procedure Date: 2022     Document: [id number]    cc:  Peter Mazariegos MD

## 2022-09-06 NOTE — PLAN OF CARE
Reason for Admission: R CEA    Cognitive/Mentation: A/Ox 4  Neuros/CMS: Intact   VS: high b/p given labetolol 10mg.   Tele: NSR.  GI: BS positive,   : . Continent.  Pulmonary: LS clear.  Pain: mild.   Skin: pale, Incision to right neck, dry skin  Activity:bedrest.  Diet: clear with thin liquids. Takes pills whole.   Discharge: possibly tomorrow    Aggression Stoplight Tool: green    End of shift summary: Patient arrived around 1230, stable post op, will recheck blood pressure and treat if needed.

## 2022-09-06 NOTE — OR NURSING
1145hr - CIRILO Ross updated on Pts stable status; Pt remains fully awake; all Neuro checks, VS; SBP  stable.  Okay for Pt to transfer out of PACU per CIRILO Ross.

## 2022-09-06 NOTE — ANESTHESIA CARE TRANSFER NOTE
Patient: Smiley Harris    Procedure: Procedure(s):  RIGHT CAROTID ENDARTERECTOMY WITH ELECTROENCEPHALOGRAM; with distal facial vein patch       Diagnosis: Carotid artery stenosis, asymptomatic, right [I65.21]  Diagnosis Additional Information: No value filed.    Anesthesia Type:   General     Note:    Oropharynx: oropharynx clear of all foreign objects and spontaneously breathing  Level of Consciousness: awake  Oxygen Supplementation: face mask  Level of Supplemental Oxygen (L/min / FiO2): 6  Independent Airway: airway patency satisfactory and stable  Dentition: dentition unchanged  Vital Signs Stable: post-procedure vital signs reviewed and stable  Report to RN Given: handoff report given  Patient transferred to: PACU    Handoff Report: Identifed the Patient, Identified the Reponsible Provider, Reviewed the pertinent medical history, Discussed the surgical course, Reviewed Intra-OP anesthesia mangement and issues during anesthesia, Set expectations for post-procedure period and Allowed opportunity for questions and acknowledgement of understanding      Vitals:  Vitals Value Taken Time   /77 09/06/22 1040   Temp     Pulse 79 09/06/22 1045   Resp 18 09/06/22 1045   SpO2 96 % 09/06/22 1045   Vitals shown include unvalidated device data.    Electronically Signed By: HAIR Crum CRNA  September 6, 2022  10:47 AM

## 2022-09-06 NOTE — ANESTHESIA PROCEDURE NOTES
Arterial Line Procedure Note    Pre-Procedure   Staff -        Anesthesiologist:  Jose D Braswell MD       Performed By: Anesthesiologist       Location: pre-op       Pre-Anesthestic Checklist: patient identified, IV checked, site marked, risks and benefits discussed, informed consent, monitors and equipment checked, pre-op evaluation and at physician/surgeon's request  Timeout:       Correct Patient: Yes        Correct Procedure: Yes        Correct Site: Yes        Correct Position: Yes   Line Placement:   This line was placed Post Induction  Procedure   Procedure: arterial line       Laterality: left       Insertion Site: radial (Radial).  Sterile Prep        All elements of maximal sterile barrier technique followed       Patient Prep/Sterile Barriers: hand hygiene, sterile gloves, hat, mask, draped, sterile gown, draped       Skin prep: Chloraprep  Insertion/Injection        Technique: Seldinger Technique        Catheter Type/Size: 20 gauge, 1.75 in/4.5 cm quick cath (integral wire)  Narrative        Tegaderm dressing used.       Complications: None apparent,        Arterial waveform: Yes        IBP within 10% of NIBP: Yes

## 2022-09-06 NOTE — BRIEF OP NOTE
[facility]    Brief Operative Note    Pre-operative diagnosis: Carotid artery stenosis, asymptomatic, right [I65.21]  Post-operative diagnosis Same as pre-operative diagnosis    Procedure: Procedure(s):  RIGHT CAROTID ENDARTERECTOMY WITH ELECTROENCEPHALOGRAM; with distal facial vein patch  Surgeon: Surgeon(s) and Role:     * Peter Mazariegos - Primary     * Pretty Brooks MD - Assisting  Anesthesia: General   Estimated Blood Loss: 35 mL from 9/6/2022  7:29 AM to 9/6/2022 10:33 AM      Drains: None  Specimens: * No specimens in log *  Findings:   see op note for details.  Complications: None.  Implants: * No implants in log *      Pretty Brooks MD  09/06/22  10:37 AM

## 2022-09-06 NOTE — PROGRESS NOTES
VASCULAR SURGERY PROGRESS NOTE    Ms. Harris is an 83yo woman with asymptomatic right internal carotid artery stenosis now POD0 s/p right carotid endarterectomy.    She reports some back pain but is otherwise comfortable. Has not been out of bed.    Vitals reviewed.    On exam, resting comfortably in bed. Face symmetric. Right neck dressed c/d/i.    Ms. Harris is an 83yo woman with asymptomatic right internal carotid artery stenosis now POD0 s/p right carotid endarterectomy, recovering on expected course    - continue current management    Pretty Brooks MD  09/06/22  4:55 PM

## 2022-09-06 NOTE — ANESTHESIA POSTPROCEDURE EVALUATION
Patient: Smiley Harris    Procedure: Procedure(s):  RIGHT CAROTID ENDARTERECTOMY WITH ELECTROENCEPHALOGRAM; with distal facial vein patch       Anesthesia Type:  General    Note:  Disposition: Inpatient   Postop Pain Control: Uneventful            Sign Out: Well controlled pain   PONV: No   Neuro/Psych: Uneventful            Sign Out: Acceptable/Baseline neuro status   Airway/Respiratory: Uneventful            Sign Out: Acceptable/Baseline resp. status   CV/Hemodynamics: Uneventful            Sign Out: Acceptable CV status; No obvious hypovolemia; No obvious fluid overload   Other NRE: NONE   DID A NON-ROUTINE EVENT OCCUR? No           Last vitals:  Vitals Value Taken Time   /55 09/06/22 1140   Temp 37  C (98.6  F) 09/06/22 1120   Pulse 52 09/06/22 1149   Resp 11 09/06/22 1149   SpO2 99 % 09/06/22 1149   Vitals shown include unvalidated device data.    Electronically Signed By: Kane Ross MD  September 6, 2022  11:50 AM

## 2022-09-06 NOTE — ANESTHESIA PREPROCEDURE EVALUATION
Anesthesia Pre-Procedure Evaluation    Patient: Smiley Harris   MRN: [id number] : 1940        Procedure : Procedure(s):  RIGHT CAROTID ENDARTERECTOMY WITH ELECTROENCEPHALOGRAM          Past Medical History:   Diagnosis Date     Arthritis      Atrial fibrillation (H)      Bilateral carotid artery stenosis      Depression with anxiety      Edema of lower extremity left      Gastroesophageal reflux disease      Gastroesophageal reflux disease with esophagitis      Hypertension      Mixed hyperlipidemia      Obese      Pes anserinus bursitis      Unspecified cataract       Past Surgical History:   Procedure Laterality Date     ABDOMEN SURGERY      exp lap     CARDIAC SURGERY      CABG     ENDARTERECTOMY CAROTID Left 2020    Procedure: LEFT CAROTID ENDARTERECTOMY WITH ELECTROENCEPHALOGRAM;  Surgeon: Peter Mazariegos MD;  Location:  OR     EYE SURGERY  2019    R cataract extraction     ORTHOPEDIC SURGERY      L TKA      Allergies   Allergen Reactions     Atorvastatin Muscle Pain (Myalgia)     Lisinopril Cough      Social History     Tobacco Use     Smoking status: Never Smoker     Smokeless tobacco: Never Used   Substance Use Topics     Alcohol use: No      Wt Readings from Last 1 Encounters:   22 107.6 kg (237 lb 4.8 oz)        Anesthesia Evaluation            ROS/MED HX  ENT/Pulmonary:    (-) tobacco use and sleep apnea   Neurologic:       Cardiovascular:     (+) Dyslipidemia hypertension-Peripheral Vascular Disease-CAD -CABG--dysrhythmias, a-fib,     METS/Exercise Tolerance:     Hematologic:       Musculoskeletal:   (+) arthritis,     GI/Hepatic:     (+) GERD, Asymptomatic on medication,     Renal/Genitourinary:     (+) renal disease, type: CRI,     Endo:     (+) thyroid problem, hypothyroidism, Obesity,     Psychiatric/Substance Use:     (+) psychiatric history anxiety and depression     Infectious Disease:       Malignancy:       Other:            Physical Exam    Airway         Mallampati: III   TM distance: > 3 FB   Neck ROM: full   Mouth opening: > 3 cm    Respiratory Devices and Support         Dental  no notable dental history         Cardiovascular   cardiovascular exam normal          Pulmonary   pulmonary exam normal                OUTSIDE LABS:  CBC:   Lab Results   Component Value Date    WBC 9.9 09/11/2020    WBC 13.3 (H) 09/10/2020    HGB 11.3 (L) 09/11/2020    HGB 11.0 (L) 09/10/2020    HCT 37.7 09/11/2020    HCT 35.3 09/10/2020     09/11/2020     09/10/2020     BMP:   Lab Results   Component Value Date     09/11/2020     09/10/2020    POTASSIUM 4.3 09/11/2020    POTASSIUM 4.4 09/10/2020    CHLORIDE 111 (H) 09/11/2020    CHLORIDE 111 (H) 09/10/2020    CO2 28 09/11/2020    CO2 24 09/10/2020    BUN 23 09/11/2020    BUN 21 09/10/2020    CR 1.12 (H) 09/11/2020    CR 1.15 (H) 09/10/2020    GLC 89 09/11/2020     (H) 09/10/2020     COAGS: No results found for: PTT, INR, FIBR  POC: No results found for: BGM, HCG, HCGS  HEPATIC: No results found for: ALBUMIN, PROTTOTAL, ALT, AST, GGT, ALKPHOS, BILITOTAL, BILIDIRECT, GURJIT  OTHER:   Lab Results   Component Value Date    A1C 5.5 09/09/2020    GENEVIEVE 8.3 (L) 09/11/2020       Anesthesia Plan    ASA Status:  3   NPO Status:  NPO Appropriate    Anesthesia Type: General.     - Airway: ETT   Induction: Intravenous, Propofol.   Maintenance: Inhalation.   Techniques and Equipment:     - Airway: Video-Laryngoscope     - Lines/Monitors: Arterial Line     Consents    Anesthesia Plan(s) and associated risks, benefits, and realistic alternatives discussed. Questions answered and patient/representative(s) expressed understanding.    - Discussed:     - Discussed with:  Patient         Postoperative Care    Pain management: IV analgesics, Oral pain medications.   PONV prophylaxis: Ondansetron (or other 5HT-3)     Comments:    Other Comments: A-Line after induction  No versed            Jose D Braswell MD

## 2022-09-06 NOTE — ANESTHESIA PROCEDURE NOTES
Airway       Patient location during procedure: OR       Procedure Start/Stop Times: 9/6/2022 7:43 AM  Staff -        CRNA: Jenae Douglas APRN CRNA       Performed By: CRNA  Consent for Airway        Urgency: elective  Indications and Patient Condition       Indications for airway management: alek-procedural       Induction type:intravenous       Mask difficulty assessment: 2 - vent by mask + OA or adjuvant +/- NMBA    Final Airway Details       Final airway type: endotracheal airway       Successful airway: ETT - single  Endotracheal Airway Details        ETT size (mm): 6.5       Cuffed: yes       Successful intubation technique: video laryngoscopy       VL Blade Size: Glidescope 3       Grade View of Cords: 1       Adjucts: stylet       Position: Left       Measured from: gums/teeth       Secured at (cm): 21       Bite block used: None    Post intubation assessment        Placement verified by: capnometry, equal breath sounds and chest rise        Number of attempts at approach: 1       Number of other approaches attempted: 0       Secured with: silk tape       Ease of procedure: easy       Dentition: Unchanged    Medication(s) Administered   Medication Administration Time: 9/6/2022 7:43 AM    Additional Comments       Glidescope used electively due to small mouth opening

## 2022-09-07 ENCOUNTER — APPOINTMENT (OUTPATIENT)
Dept: CARDIOLOGY | Facility: CLINIC | Age: 82
DRG: 038 | End: 2022-09-07
Attending: PHYSICIAN ASSISTANT
Payer: MEDICARE

## 2022-09-07 ENCOUNTER — APPOINTMENT (OUTPATIENT)
Dept: GENERAL RADIOLOGY | Facility: CLINIC | Age: 82
DRG: 038 | End: 2022-09-07
Payer: MEDICARE

## 2022-09-07 LAB
ALBUMIN SERPL-MCNC: 3.3 G/DL (ref 3.4–5)
ALP SERPL-CCNC: 87 U/L (ref 40–150)
ALT SERPL W P-5'-P-CCNC: 14 U/L (ref 0–50)
ANION GAP SERPL CALCULATED.3IONS-SCNC: 6 MMOL/L (ref 3–14)
AST SERPL W P-5'-P-CCNC: 12 U/L (ref 0–45)
BASE EXCESS BLDV CALC-SCNC: -2.5 MMOL/L (ref -7.7–1.9)
BASOPHILS # BLD AUTO: 0 10E3/UL (ref 0–0.2)
BASOPHILS NFR BLD AUTO: 0 %
BILIRUB SERPL-MCNC: 0.7 MG/DL (ref 0.2–1.3)
BUN SERPL-MCNC: 28 MG/DL (ref 7–30)
CALCIUM SERPL-MCNC: 8.5 MG/DL (ref 8.5–10.1)
CHLORIDE BLD-SCNC: 109 MMOL/L (ref 94–109)
CO2 SERPL-SCNC: 22 MMOL/L (ref 20–32)
CREAT SERPL-MCNC: 1.54 MG/DL (ref 0.52–1.04)
EOSINOPHIL # BLD AUTO: 0 10E3/UL (ref 0–0.7)
EOSINOPHIL NFR BLD AUTO: 0 %
ERYTHROCYTE [DISTWIDTH] IN BLOOD BY AUTOMATED COUNT: 12.6 % (ref 10–15)
GFR SERPL CREATININE-BSD FRML MDRD: 34 ML/MIN/1.73M2
GLUCOSE BLD-MCNC: 144 MG/DL (ref 70–99)
GLUCOSE BLDC GLUCOMTR-MCNC: 111 MG/DL (ref 70–99)
GLUCOSE BLDC GLUCOMTR-MCNC: 126 MG/DL (ref 70–99)
GLUCOSE BLDC GLUCOMTR-MCNC: 130 MG/DL (ref 70–99)
GLUCOSE BLDC GLUCOMTR-MCNC: 136 MG/DL (ref 70–99)
HCO3 BLDV-SCNC: 23 MMOL/L (ref 21–28)
HCT VFR BLD AUTO: 37.5 % (ref 35–47)
HGB BLD-MCNC: 12 G/DL (ref 11.7–15.7)
IMM GRANULOCYTES # BLD: 0.1 10E3/UL
IMM GRANULOCYTES NFR BLD: 1 %
LVEF ECHO: NORMAL
LYMPHOCYTES # BLD AUTO: 0.6 10E3/UL (ref 0.8–5.3)
LYMPHOCYTES NFR BLD AUTO: 5 %
MAGNESIUM SERPL-MCNC: 2.1 MG/DL (ref 1.6–2.3)
MCH RBC QN AUTO: 29.3 PG (ref 26.5–33)
MCHC RBC AUTO-ENTMCNC: 32 G/DL (ref 31.5–36.5)
MCV RBC AUTO: 92 FL (ref 78–100)
MONOCYTES # BLD AUTO: 1 10E3/UL (ref 0–1.3)
MONOCYTES NFR BLD AUTO: 8 %
NEUTROPHILS # BLD AUTO: 10.4 10E3/UL (ref 1.6–8.3)
NEUTROPHILS NFR BLD AUTO: 86 %
NRBC # BLD AUTO: 0 10E3/UL
NRBC BLD AUTO-RTO: 0 /100
NT-PROBNP SERPL-MCNC: 2277 PG/ML (ref 0–1800)
O2/TOTAL GAS SETTING VFR VENT: 0 %
PCO2 BLDV: 43 MM HG (ref 40–50)
PH BLDV: 7.34 [PH] (ref 7.32–7.43)
PLATELET # BLD AUTO: 281 10E3/UL (ref 150–450)
PO2 BLDV: 49 MM HG (ref 25–47)
POTASSIUM BLD-SCNC: 5 MMOL/L (ref 3.4–5.3)
PROT SERPL-MCNC: 7.4 G/DL (ref 6.8–8.8)
RBC # BLD AUTO: 4.09 10E6/UL (ref 3.8–5.2)
SODIUM SERPL-SCNC: 137 MMOL/L (ref 133–144)
TROPONIN I SERPL HS-MCNC: 7 NG/L
TROPONIN I SERPL HS-MCNC: 8 NG/L
TSH SERPL DL<=0.005 MIU/L-ACNC: 0.99 MU/L (ref 0.4–4)
WBC # BLD AUTO: 12.1 10E3/UL (ref 4–11)

## 2022-09-07 PROCEDURE — 93005 ELECTROCARDIOGRAM TRACING: CPT

## 2022-09-07 PROCEDURE — 36415 COLL VENOUS BLD VENIPUNCTURE: CPT

## 2022-09-07 PROCEDURE — 250N000013 HC RX MED GY IP 250 OP 250 PS 637: Performed by: SURGERY

## 2022-09-07 PROCEDURE — 71045 X-RAY EXAM CHEST 1 VIEW: CPT

## 2022-09-07 PROCEDURE — 80053 COMPREHEN METABOLIC PANEL: CPT

## 2022-09-07 PROCEDURE — 83880 ASSAY OF NATRIURETIC PEPTIDE: CPT

## 2022-09-07 PROCEDURE — 85025 COMPLETE CBC W/AUTO DIFF WBC: CPT

## 2022-09-07 PROCEDURE — 84443 ASSAY THYROID STIM HORMONE: CPT | Performed by: PHYSICIAN ASSISTANT

## 2022-09-07 PROCEDURE — 250N000013 HC RX MED GY IP 250 OP 250 PS 637

## 2022-09-07 PROCEDURE — 250N000009 HC RX 250: Performed by: PHYSICIAN ASSISTANT

## 2022-09-07 PROCEDURE — 83735 ASSAY OF MAGNESIUM: CPT

## 2022-09-07 PROCEDURE — 250N000011 HC RX IP 250 OP 636: Performed by: PHYSICIAN ASSISTANT

## 2022-09-07 PROCEDURE — 93010 ELECTROCARDIOGRAM REPORT: CPT | Performed by: INTERNAL MEDICINE

## 2022-09-07 PROCEDURE — 93306 TTE W/DOPPLER COMPLETE: CPT | Mod: 26 | Performed by: INTERNAL MEDICINE

## 2022-09-07 PROCEDURE — 999N000208 ECHOCARDIOGRAM COMPLETE

## 2022-09-07 PROCEDURE — 99221 1ST HOSP IP/OBS SF/LOW 40: CPT | Performed by: PHYSICIAN ASSISTANT

## 2022-09-07 PROCEDURE — 82803 BLOOD GASES ANY COMBINATION: CPT

## 2022-09-07 PROCEDURE — 250N000009 HC RX 250

## 2022-09-07 PROCEDURE — 84484 ASSAY OF TROPONIN QUANT: CPT | Performed by: PHYSICIAN ASSISTANT

## 2022-09-07 PROCEDURE — 250N000011 HC RX IP 250 OP 636: Performed by: SURGERY

## 2022-09-07 PROCEDURE — 250N000013 HC RX MED GY IP 250 OP 250 PS 637: Performed by: HOSPITALIST

## 2022-09-07 PROCEDURE — 255N000002 HC RX 255 OP 636: Performed by: SURGERY

## 2022-09-07 PROCEDURE — 120N000001 HC R&B MED SURG/OB

## 2022-09-07 PROCEDURE — 36415 COLL VENOUS BLD VENIPUNCTURE: CPT | Performed by: PHYSICIAN ASSISTANT

## 2022-09-07 PROCEDURE — 250N000009 HC RX 250: Performed by: SURGERY

## 2022-09-07 PROCEDURE — 84484 ASSAY OF TROPONIN QUANT: CPT

## 2022-09-07 PROCEDURE — 250N000011 HC RX IP 250 OP 636

## 2022-09-07 RX ORDER — HYDRALAZINE HYDROCHLORIDE 20 MG/ML
10 INJECTION INTRAMUSCULAR; INTRAVENOUS EVERY 4 HOURS PRN
Status: DISCONTINUED | OUTPATIENT
Start: 2022-09-07 | End: 2022-09-08 | Stop reason: HOSPADM

## 2022-09-07 RX ORDER — HYDRALAZINE HYDROCHLORIDE 20 MG/ML
INJECTION INTRAMUSCULAR; INTRAVENOUS
Status: COMPLETED
Start: 2022-09-07 | End: 2022-09-07

## 2022-09-07 RX ORDER — FUROSEMIDE 10 MG/ML
20 INJECTION INTRAMUSCULAR; INTRAVENOUS ONCE
Status: COMPLETED | OUTPATIENT
Start: 2022-09-07 | End: 2022-09-07

## 2022-09-07 RX ORDER — HYDRALAZINE HYDROCHLORIDE 20 MG/ML
10-20 INJECTION INTRAMUSCULAR; INTRAVENOUS
Status: COMPLETED | OUTPATIENT
Start: 2022-09-07 | End: 2022-09-07

## 2022-09-07 RX ORDER — LORAZEPAM 0.5 MG/1
0.25 TABLET ORAL ONCE
Status: COMPLETED | OUTPATIENT
Start: 2022-09-07 | End: 2022-09-07

## 2022-09-07 RX ORDER — METOPROLOL TARTRATE 1 MG/ML
2.5 INJECTION, SOLUTION INTRAVENOUS EVERY 4 HOURS PRN
Status: DISCONTINUED | OUTPATIENT
Start: 2022-09-07 | End: 2022-09-08 | Stop reason: HOSPADM

## 2022-09-07 RX ORDER — CLOPIDOGREL BISULFATE 75 MG/1
75 TABLET ORAL DAILY
Status: DISCONTINUED | OUTPATIENT
Start: 2022-09-07 | End: 2022-09-08 | Stop reason: HOSPADM

## 2022-09-07 RX ORDER — NITROGLYCERIN 0.4 MG/1
0.4 TABLET SUBLINGUAL EVERY 5 MIN PRN
Status: DISCONTINUED | OUTPATIENT
Start: 2022-09-07 | End: 2022-09-08 | Stop reason: HOSPADM

## 2022-09-07 RX ADMIN — ASPIRIN 81 MG: 81 TABLET, COATED ORAL at 09:07

## 2022-09-07 RX ADMIN — LIDOCAINE 4%: 4 CREAM TOPICAL at 11:40

## 2022-09-07 RX ADMIN — HUMAN ALBUMIN MICROSPHERES AND PERFLUTREN 9 ML: 10; .22 INJECTION, SOLUTION INTRAVENOUS at 10:56

## 2022-09-07 RX ADMIN — LORAZEPAM 0.25 MG: 0.5 TABLET ORAL at 21:54

## 2022-09-07 RX ADMIN — ACETAMINOPHEN 975 MG: 325 TABLET ORAL at 06:30

## 2022-09-07 RX ADMIN — ROSUVASTATIN CALCIUM 10 MG: 5 TABLET, FILM COATED ORAL at 00:25

## 2022-09-07 RX ADMIN — HYDROCHLOROTHIAZIDE 25 MG: 25 TABLET ORAL at 09:07

## 2022-09-07 RX ADMIN — LABETALOL HYDROCHLORIDE 10 MG: 5 INJECTION, SOLUTION INTRAVENOUS at 01:40

## 2022-09-07 RX ADMIN — LABETALOL HYDROCHLORIDE 10 MG: 5 INJECTION, SOLUTION INTRAVENOUS at 01:21

## 2022-09-07 RX ADMIN — OXYCODONE HYDROCHLORIDE 5 MG: 5 TABLET ORAL at 01:46

## 2022-09-07 RX ADMIN — HEPARIN SODIUM 5000 UNITS: 5000 INJECTION, SOLUTION INTRAVENOUS; SUBCUTANEOUS at 13:48

## 2022-09-07 RX ADMIN — ACETAMINOPHEN 975 MG: 325 TABLET ORAL at 21:08

## 2022-09-07 RX ADMIN — NITROGLYCERIN 0.4 MG: 0.4 TABLET SUBLINGUAL at 02:06

## 2022-09-07 RX ADMIN — GABAPENTIN 100 MG: 100 CAPSULE ORAL at 21:08

## 2022-09-07 RX ADMIN — CLOPIDOGREL BISULFATE 75 MG: 75 TABLET ORAL at 09:07

## 2022-09-07 RX ADMIN — LIDOCAINE HYDROCHLORIDE 30 ML: 20 SOLUTION ORAL; TOPICAL at 02:23

## 2022-09-07 RX ADMIN — FUROSEMIDE 20 MG: 10 INJECTION, SOLUTION INTRAMUSCULAR; INTRAVENOUS at 17:08

## 2022-09-07 RX ADMIN — HEPARIN SODIUM 5000 UNITS: 5000 INJECTION, SOLUTION INTRAVENOUS; SUBCUTANEOUS at 06:29

## 2022-09-07 RX ADMIN — SENNOSIDES AND DOCUSATE SODIUM 1 TABLET: 50; 8.6 TABLET ORAL at 09:07

## 2022-09-07 RX ADMIN — HYDRALAZINE HYDROCHLORIDE 10 MG: 20 INJECTION INTRAMUSCULAR; INTRAVENOUS at 02:24

## 2022-09-07 RX ADMIN — ACETAMINOPHEN 975 MG: 325 TABLET ORAL at 13:47

## 2022-09-07 RX ADMIN — POLYETHYLENE GLYCOL 3350 17 G: 17 POWDER, FOR SOLUTION ORAL at 09:08

## 2022-09-07 RX ADMIN — LIDOCAINE 4%: 4 CREAM TOPICAL at 17:09

## 2022-09-07 RX ADMIN — METOPROLOL TARTRATE 25 MG: 25 TABLET, FILM COATED ORAL at 20:19

## 2022-09-07 RX ADMIN — METOPROLOL TARTRATE 25 MG: 25 TABLET, FILM COATED ORAL at 09:06

## 2022-09-07 RX ADMIN — LOSARTAN POTASSIUM 50 MG: 50 TABLET, FILM COATED ORAL at 09:07

## 2022-09-07 RX ADMIN — ROSUVASTATIN CALCIUM 10 MG: 5 TABLET, FILM COATED ORAL at 20:19

## 2022-09-07 RX ADMIN — LEVOTHYROXINE SODIUM 25 MCG: 25 TABLET ORAL at 06:30

## 2022-09-07 RX ADMIN — LABETALOL HYDROCHLORIDE 10 MG: 5 INJECTION, SOLUTION INTRAVENOUS at 00:52

## 2022-09-07 RX ADMIN — HEPARIN SODIUM 5000 UNITS: 5000 INJECTION, SOLUTION INTRAVENOUS; SUBCUTANEOUS at 21:08

## 2022-09-07 RX ADMIN — METOPROLOL TARTRATE 2.5 MG: 5 INJECTION INTRAVENOUS at 19:40

## 2022-09-07 ASSESSMENT — ACTIVITIES OF DAILY LIVING (ADL)
ADLS_ACUITY_SCORE: 33
EQUIPMENT_CURRENTLY_USED_AT_HOME: CANE, STRAIGHT
WALKING_OR_CLIMBING_STAIRS: AMBULATION DIFFICULTY, REQUIRES EQUIPMENT
DRESSING/BATHING_DIFFICULTY: NO
ADLS_ACUITY_SCORE: 36
ADLS_ACUITY_SCORE: 36
WEAR_GLASSES_OR_BLIND: YES
VISION_MANAGEMENT: GLASSES
FALL_HISTORY_WITHIN_LAST_SIX_MONTHS: NO
CONCENTRATING,_REMEMBERING_OR_MAKING_DECISIONS_DIFFICULTY: NO
DOING_ERRANDS_INDEPENDENTLY_DIFFICULTY: NO
TOILETING_ISSUES: NO
ADLS_ACUITY_SCORE: 33
ADLS_ACUITY_SCORE: 36
ADLS_ACUITY_SCORE: 36
ADLS_ACUITY_SCORE: 33
ADLS_ACUITY_SCORE: 33
DIFFICULTY_COMMUNICATING: NO
CHANGE_IN_FUNCTIONAL_STATUS_SINCE_ONSET_OF_CURRENT_ILLNESS/INJURY: NO
ADLS_ACUITY_SCORE: 33
ADLS_ACUITY_SCORE: 36
WALKING_OR_CLIMBING_STAIRS_DIFFICULTY: YES
ADLS_ACUITY_SCORE: 36
DIFFICULTY_EATING/SWALLOWING: NO
ADLS_ACUITY_SCORE: 36

## 2022-09-07 NOTE — PLAN OF CARE
Pt A&Ox 4. CMS intact. VSS on RA. Tele Afib, lungs clear. Incision WNL. Up with 1 with cane. Taking lidocaine cream for pain.  Voiding BR. Continue to monitor.

## 2022-09-07 NOTE — PROVIDER NOTIFICATION
MD Notification    Notified Person: MD    Notified Person Name: Wiliam Kem GUZMAN    Notification Date/Time: 09/07/2022    Notification Interaction: Text page with call back    Purpose of Notification:    FYI - Pt was complaining worsening L side pain radiating to back and pins and needs on chest. RRT was called to evaluate. Tropinin and EKG negative.     Also, notified MD with Pts elevated BP's ineffective with Labetalol given x4. Gave nitroglycerin and hydralazine x1 with results during RRT.    No new orders at this time

## 2022-09-07 NOTE — PROVIDER NOTIFICATION
pt has tremor and shaking. she stated that this is new symptoms. , /59. Denies feeling coldness. Paged SAI Hilliard

## 2022-09-07 NOTE — CONSULTS
[facility]  Consult Note - Hospitalist Service     Date of Admission:  9/6/2022  Consult Requested by: Vascular surgery  Reason for Consult: Medical comanagement, elevated proBNP    Assessment & Plan   Smiley Harris is a 82 year old female with PMH significant for CAD s/p CABG, HTN, HLD, hx Afib post CABG, CKD3, hypothyroidism, obesity class III, GERD, depression, anxiety, OA, and left carotid stenosis s/p CEA who was admitted to [facility] on 9/6/2022 by the vascular surgery service for Rt CEA due to progressive high grade asymptotic calcified right internal carotid artery stenosis.     Acute postoperative chest pain  Elevated proBNP  RRT called early a.m. 9/7 for chest pain after surgery the day prior.  Patient with extensive cardiac history including CAD status post three-vessel CABG, atrial fibrillation with RVR, hypertension. The patient reported left sided pain feeling like pins/needlings following the procedure, which is not radiating to the left side of her chest and to the back. Pt endorsed the pain is similar to what she had prior to her CABGx3. Workup during RRT significant for stable Cr 1.54, normal liver enzymes, normal troponin 8, proBNP 2,277. WBC 12.1, ANC 10.4. CXR with cardiomegaly, mild central pulmonary venous congestion. Pain is reproducible, improving and with negative cardiac workup and normal troponin not likely to be cardiac related. Will give 1 time low dose IV Lasix due to elevated proBNP and difficult to see RV/filling on ECHO, 1.1L IVF perioperatively, however the patient is not hypoxic, no rales, and BLE are generalized edema without any pitting, she does not appear overtly fluid overloaded on exam and her Creatinine is 1.5 which is baseline therefore will hold off on any additional diuretics or higher doses from her hydrochlorothiazide and 1 time dose Lasix unless warranted.  - Telemetry  - Recheck troponin - negative  - ECHO - EF  55-60%, LA severely dilated, mild to mod TR, mild pulmonary HTN, indeterminate LV filling pressures, RV fxn cannot be assessed due to poor image quality.  - Give 1 time low dose IV furosemide 20mg x1 and monitor  - Pain control  - Daily weights, I/Os  - Compression stockings  - 2 gram sodium diet  - Heparin subcutaneous ordered for DVT ppx by primary service  - Recheck proBNP in AM  - Monitor oxygen, blood pressure, fluid status, and consider IV Lasix if becoming hypoxic and hypertensive or appears fluid overloaded      Intake/Output Summary (Last 24 hours) at 9/7/2022 1624  Last data filed at 9/7/2022 1055  Gross per 24 hour   Intake 200 ml   Output --   Net 200 ml       Vitals:    09/06/22 0612   Weight: 107.6 kg (237 lb 4.8 oz)       Wt Readings from Last 4 Encounters:   09/06/22 107.6 kg (237 lb 4.8 oz)   09/12/20 108.2 kg (238 lb 8.6 oz)   10/25/18 95.3 kg (210 lb)   09/27/13 103.2 kg (227 lb 8 oz)       Progressive high-grade asymptomatic calcified right internal carotid artery stenosis status post endarterectomy with distal facial vein patch angioplasty 9/6/2022   History of left carotid stenosis s/p endarterectomy  Right CEA with Dr. Mazariegos 9/6. GETA. EBL 35 mL. No immediate postoperative complications.  - Routine postoperative cares per primary service, including IVF, pain control, abx, DVT ppx, and disposition  - Aggressive pulmonary toilet, frequent IS use 10x/hour while awake  - Monitor Hgb  - Disposition per primary team    Recent Labs   Lab 09/07/22  0159 09/06/22  0608   HGB 12.0 12.1       Coronary artery disease s/p 3v-CABG 6/2020  Benign essential hypertension  Hyperlipidemia  Hx LIMA to LAD, SVG to OM, SVT go PDA. Surgery by Dr. Monroy at [facility].   PTA Regimen: hydrochlorothiazide 25mg/d, losartan 50mg/d, metoprolol tartrte 25mg/d, rosuvastatin 10mg every other night  EKG: nonischemic  Last ECHO 2020: EF >55%  Follows with cardiology at [facility], Dr. Jean Bradshaw. Last visit June 2022.  -  Telemetry  - Continue PTA ARB, BB, statin, and thiazide  - Continue ASA/DAPT  - Continue PTA antihypertensives with hold parameters  - PRN IV hydralazine available for SBP >180  - Monitor BP trend and need for medication adjustments    Hx postoperative paroxysmal atrial fibrillation, not on chronic anticoagulation  Rate controlled with metoprolol tartrate 25mg BID.  Not anticoagulated presently, previously on Eliquis and was to start a different medication. Vascular surgery looking into this to decide on DVT ppx postop and ongoing.  - Telemetry  - Continue PTA beta blocker with hold paramters  - DVT ppx per primary service, vascular surgery deciding on AC  - PRN IV metoprolol for sustained heart rate >120  - Monitor K+/Mg++, replace PRN    Chronic kidney disease, stage 3  Baseline Cr 1.5-1.6. On admission, at baseline.  - Avoid nephrotoxins - contrast, NSAIDs  - Renally dose medications as able/needed  - Monitor    Hypothyroidism  - Continue PTA TRT  - Check TSH, last done Feb WNL    Other pertinent history and chronic stable diagnoses: Appropriate PTA medications will be resumed.  Obesity class III: Body mass index is 43.4 kg/m . Increase in all cause mortality. Close follow up with PCP, diet and lifestyle changes  GERD: Continue PTA omeprazole 20mg/d  Depression  Anxiety  OA s/p L TKA  Recurrent LLE cellulitis  Rt knee OA      Diet: 2 Gram Sodium Diet  Diet    DVT Prophylaxis: Defer to primary service  Simon Catheter: Not present  Code Status: No CPR- Do NOT Intubate      The patient's care was discussed with the Attending Physician, Dr. Ortiz, Patient and Patient's Family.    KATELYNN Martell, PA-C  Hospitalist [facility]  [facility]  Securely message with the Vocera Web Console (learn more here)  Text page via [employer] Paging/Directory  ______________________________________________________________________    Chief Complaint   Chest pain    History is obtained from the patient and  electronic health record    History of Present Illness   Smiley Harris is a 82 year old female with PMH significant for CAD s/p CABG, HTN, HLD, hx Afib post CABG, CKD3, hypothyroidism, obesity class III, GERD, depression, anxiety, OA, and left carotid stenosis s/p CEA who was admitted to [facility] on 9/6/2022 by the vascular surgery service for Rt CEA due to progressive high grade asymptotic calcified right internal carotid artery stenosis. Surgery with Dr. Mazariegos, no immediate postoperative complications, GETA, EBL 35mL. The Hospitalist service was consulted on postoperative day 1 for medical co-management as well as her elevated proBNP which was checked during rapid response overnight for chest pain. Preoperative H&P reviewed.    During my visit, the patient's pain is improving, intermittent and reproducible. Pain 2/10 at times. Reports much worse last night, radiates to scapula and worse with position changes. She endorses tremor to all extremities since surgery, mild, able to stop voluntarily but reports has never had tremor before. She denies N/V, palpitations, and SOB. She notes she uses compression stockings at home, baseline generalized BLE edema, nonpitting. No hypoxia. No pleuritic pain. No rales, rales, or rhonchi to lungs. No increased WOB. She does note she gets SOB at times with exertion. Follows with cardiology at [facility]. ECHO with normal EF but  difficult to see RV/filling.    Review of Systems   The 10 point Review of Systems is negative other than noted in the HPI or here.     Past Medical History    I have reviewed this patient's medical history and updated it with pertinent information if needed.   Past Medical History:   Diagnosis Date     Arthritis      Atrial fibrillation (H)      Bilateral carotid artery stenosis      Depression with anxiety      Edema of lower extremity left      Gastroesophageal reflux disease      Gastroesophageal reflux disease with  esophagitis      Hypertension      Mixed hyperlipidemia      Obese      Pes anserinus bursitis      Unspecified cataract        Past Surgical History   I have reviewed this patient's surgical history and updated it with pertinent information if needed.  Past Surgical History:   Procedure Laterality Date     ABDOMEN SURGERY      exp lap     CARDIAC SURGERY      CABG     ENDARTERECTOMY CAROTID Left 9/9/2020    Procedure: LEFT CAROTID ENDARTERECTOMY WITH ELECTROENCEPHALOGRAM;  Surgeon: Peter Mazariegos MD;  Location:  OR     ENDARTERECTOMY CAROTID Right 9/6/2022    Procedure: RIGHT CAROTID ENDARTERECTOMY WITH ELECTROENCEPHALOGRAM; with distal facial vein patch;  Surgeon: Peter Mazariegos;  Location:  OR     EYE SURGERY  2019    R cataract extraction     ORTHOPEDIC SURGERY      L TKA       Social History   I have reviewed this patient's social history and updated it with pertinent information if needed.  Social History     Tobacco Use     Smoking status: Never Smoker     Smokeless tobacco: Never Used   Substance Use Topics     Alcohol use: No     Drug use: No       Family History   I have reviewed this patient's family history and updated it with pertinent information if needed.   Family History   Problem Relation Age of Onset     Cancer Father        Medications   I have reviewed this patient's current medications  Medications Prior to Admission   Medication Sig Dispense Refill Last Dose     aspirin 81 MG tablet Take 1 tablet by mouth daily.   9/6/2022 at AM     hydrochlorothiazide (HYDRODIURIL) 25 MG tablet Take 25 mg by mouth every morning   9/5/2022 at AM     levothyroxine (SYNTHROID/LEVOTHROID) 25 MCG tablet Take 25 mcg by mouth        levothyroxine (SYNTHROID/LEVOTHROID) 25 MCG tablet Take 25 mcg by mouth every morning   9/5/2022 at AM     losartan (COZAAR) 50 MG tablet Take 50 mg by mouth every morning   9/5/2022 at AM     metoprolol tartrate (LOPRESSOR) 25 MG tablet Take 25 mg by mouth 2 times  daily   9/6/2022 at AM     rosuvastatin (CRESTOR) 10 MG tablet Take 10 mg by mouth every other day   9/5/2022 at HS     omeprazole (PRILOSEC) 20 MG DR capsule Take 20 mg by mouth          Allergies   Allergies   Allergen Reactions     Atorvastatin Muscle Pain (Myalgia) and Other (See Comments)     Lisinopril Cough and Other (See Comments)       Physical Exam   Vital Signs: Temp: 97.2  F (36.2  C) Temp src: Oral BP: 123/51 Pulse: 102   Resp: 15 SpO2: 96 % O2 Device: None (Room air) Oxygen Delivery: 2 LPM  Weight: 237 lbs 4.8 oz    Physical Exam    General: Awake, alert, very pleasant talkative woman who appears stated age. Looks comfortable sitting up in bed. No acute distress.  HEENT: Normocephalic, atraumatic. Extraocular movements intact. Rt CEA dressing intact.  Respiratory: Clear to auscultation bilaterally, no rales, wheezing, or rhonchi.  Cardiovascular: Regular rate and rhythm, +S1 and S2, no murmur auscultated. Generalized nonpitting peripheral edema.   Gastrointestinal: Soft, non-tender, obese but non-distended. Bowel sounds present.  Skin: Warm, dry. No obvious rashes or lesions on exposed skin. Dorsalis pedis pulses palpable bilaterally.  Musculoskeletal: No joint swelling, erythema or tenderness. Moves all extremities equally.  Neurologic: AAO x3. Cranial nerves 2-12 grossly intact, normal strength and sensation.  Psychiatric: Appropriate mood and affect. No obvious anxiety or depression.    Data   Results for orders placed or performed during the hospital encounter of 09/06/22 (from the past 24 hour(s))   Troponin I   Result Value Ref Range    Troponin I High Sensitivity 8 <54 ng/L   Comprehensive metabolic panel   Result Value Ref Range    Sodium 137 133 - 144 mmol/L    Potassium 5.0 3.4 - 5.3 mmol/L    Chloride 109 94 - 109 mmol/L    Carbon Dioxide (CO2) 22 20 - 32 mmol/L    Anion Gap 6 3 - 14 mmol/L    Urea Nitrogen 28 7 - 30 mg/dL    Creatinine 1.54 (H) 0.52 - 1.04 mg/dL    Calcium 8.5 8.5 - 10.1  mg/dL    Glucose 144 (H) 70 - 99 mg/dL    Alkaline Phosphatase 87 40 - 150 U/L    AST 12 0 - 45 U/L    ALT 14 0 - 50 U/L    Protein Total 7.4 6.8 - 8.8 g/dL    Albumin 3.3 (L) 3.4 - 5.0 g/dL    Bilirubin Total 0.7 0.2 - 1.3 mg/dL    GFR Estimate 34 (L) >60 mL/min/1.73m2   CBC with platelets differential    Narrative    The following orders were created for panel order CBC with platelets differential.  Procedure                               Abnormality         Status                     ---------                               -----------         ------                     CBC with platelets and d...[[id number]]  Abnormal            Final result                 Please view results for these tests on the individual orders.   Nt probnp inpatient   Result Value Ref Range    N terminal Pro BNP Inpatient 2,277 (H) 0 - 1,800 pg/mL   Blood gas venous   Result Value Ref Range    pH Venous 7.34 7.32 - 7.43    pCO2 Venous 43 40 - 50 mm Hg    pO2 Venous 49 (H) 25 - 47 mm Hg    Bicarbonate Venous 23 21 - 28 mmol/L    Base Excess/Deficit (+/-) -2.5 -7.7 - 1.9 mmol/L    FIO2 0    Magnesium   Result Value Ref Range    Magnesium 2.1 1.6 - 2.3 mg/dL   CBC with platelets and differential   Result Value Ref Range    WBC Count 12.1 (H) 4.0 - 11.0 10e3/uL    RBC Count 4.09 3.80 - 5.20 10e6/uL    Hemoglobin 12.0 11.7 - 15.7 g/dL    Hematocrit 37.5 35.0 - 47.0 %    MCV 92 78 - 100 fL    MCH 29.3 26.5 - 33.0 pg    MCHC 32.0 31.5 - 36.5 g/dL    RDW 12.6 10.0 - 15.0 %    Platelet Count 281 150 - 450 10e3/uL    % Neutrophils 86 %    % Lymphocytes 5 %    % Monocytes 8 %    % Eosinophils 0 %    % Basophils 0 %    % Immature Granulocytes 1 %    NRBCs per 100 WBC 0 <1 /100    Absolute Neutrophils 10.4 (H) 1.6 - 8.3 10e3/uL    Absolute Lymphocytes 0.6 (L) 0.8 - 5.3 10e3/uL    Absolute Monocytes 1.0 0.0 - 1.3 10e3/uL    Absolute Eosinophils 0.0 0.0 - 0.7 10e3/uL    Absolute Basophils 0.0 0.0 - 0.2 10e3/uL    Absolute Immature Granulocytes 0.1 <=0.4  10e3/uL    Absolute NRBCs 0.0 10e3/uL   XR Chest Port 1 View    Narrative    EXAM: XR CHEST PORT 1 VIEW  LOCATION: [facility]  DATE/TIME: 2022 2:00 AM    INDICATION: New chest pain  COMPARISON: None.      Impression    IMPRESSION: The heart is enlarged. Median sternotomy wires and surgical clips from prior cardiac surgery are noted. There is mild central pulmonary venous congestion.   EKG 12-lead, tracing only   Result Value Ref Range    Systolic Blood Pressure  mmHg    Diastolic Blood Pressure  mmHg    Ventricular Rate 69 BPM    Atrial Rate 69 BPM    ND Interval 174 ms    QRS Duration 82 ms     ms    QTc 424 ms    P Axis 51 degrees    R AXIS 25 degrees    T Axis 30 degrees    Interpretation ECG       Sinus rhythm with Premature atrial complexes  Low voltage QRS  Borderline ECG  When compared with ECG of 06-SEP-2022 07:21, (unconfirmed)  Premature atrial complexes are now Present  Minimal criteria for Anterior infarct are no longer Present     Glucose by meter   Result Value Ref Range    GLUCOSE BY METER POCT 136 (H) 70 - 99 mg/dL   Glucose by meter   Result Value Ref Range    GLUCOSE BY METER POCT 111 (H) 70 - 99 mg/dL   Troponin I   Result Value Ref Range    Troponin I High Sensitivity 7 <54 ng/L   TSH with free T4 reflex (Add on or recollect)   Result Value Ref Range    TSH 0.99 0.40 - 4.00 mU/L   Echocardiogram Complete   Result Value Ref Range    LVEF  55-60%     Narrative    [id number]  [id number]  [id number]  [id number]^[id number]^[email or web address]     [facility]  Echocardiography Laboratory  [address] [place] [address]     Name: EDWIN LEWIS  MRN: [id number]  : 1940  Study Date: 2022 10:36 AM  Age: 82 yrs  Gender: Female  Patient Location: [facility]  Reason For Study: Chest Pain  Ordering Physician: MIKAELA ROWLEY  Referring Physician: MIKAELA ROWLEY  Performed By: ELSIE Salazar     BSA: 2.1 m2  Height: 62 in  Weight:  237 lb  HR: 74  BP: 155/59 mmHg  ______________________________________________________________________________  Procedure  Complete Portable Echo Adult. Optison (NDC #1238-6565) given intravenously.  ______________________________________________________________________________  Interpretation Summary     The visual ejection fraction is 55-60%. Technically difficult study with  foreshortened views limiting accurate wall motion analysis.  The right ventricle is not well visualized. Right ventricular function cannot  be assessed due to poor image quality. Probably mildly reduced.  The left atrium is severely dilated.  There is mild to moderate (1-2+) tricuspid regurgitation.  Right ventricular systolic pressure is elevated, consistent with mild  pulmonary hypertension.  IVC diameter and respiratory changes fall into an intermediate range  suggesting an RA pressure of 8 mmHg.  There is no pericardial effusion.     Patient is hypertensive. No prior studies for comparison.  ______________________________________________________________________________  Left Ventricle  The left ventricle is normal in size. There is borderline concentric left  ventricular hypertrophy. Left ventricular systolic function is normal. The  visual ejection fraction is 55-60%. Diastolic Doppler findings (E/E' ratio  and/or other parameters) suggest left ventricular filling pressures are  indeterminate.     Right Ventricle  The right ventricle is not well visualized. Right ventricular function cannot  be assessed due to poor image quality.     Atria  The left atrium is severely dilated. Right atrium not well visualized. The  right atrium is moderately dilated.     Mitral Valve  There is mild (1+) mitral regurgitation. There is no mitral valve stenosis.     Tricuspid Valve  There is mild to moderate (1-2+) tricuspid regurgitation. The right  ventricular systolic pressure is elevated at 38.6 mmHg. Right ventricular  systolic pressure is elevated,  consistent with mild pulmonary hypertension.     Aortic Valve  The aortic valve is trileaflet with aortic valve sclerosis. There is trace to  mild aortic regurgitation. No hemodynamically significant valvular aortic  stenosis.     Pulmonic Valve  The pulmonic valve is not well visualized. There is trace to mild pulmonic  valvular regurgitation.     Vessels  The aortic root is normal size. Dilation of the inferior vena cava is present  with normal respiratory variation in diameter. IVC diameter and respiratory  changes fall into an intermediate range suggesting an RA pressure of 8 mmHg.     Pericardium  There is no pericardial effusion.     ______________________________________________________________________________  MMode/2D Measurements & Calculations  IVSd: 1.2 cm  LVIDd: 5.0 cm  LVIDs: 3.5 cm  LVPWd: 0.93 cm  FS: 30.0 %  LV mass(C)d: 194.7 grams  LV mass(C)dI: 94.8 grams/m2     Ao root diam: 2.9 cm  asc Aorta Diam: 2.9 cm  LVOT diam: 2.3 cm  LVOT area: 4.2 cm2  LA Volume (BP): 112.0 ml  LA Volume Index (BP): 54.6 ml/m2     LA Volume Indexed (AL/bp): 56.1 ml/m2  RWT: 0.37     Doppler Measurements & Calculations  MV E max bryce: 77.8 cm/sec  MV A max bryce: 62.0 cm/sec  MV E/A: 1.3  MV dec time: 0.12 sec  LV V1 max PG: 3.5 mmHg  LV V1 max: 93.8 cm/sec  LV V1 VTI: 24.5 cm  SV(LVOT): 102.3 ml  SI(LVOT): 49.8 ml/m2  PA acc time: 0.12 sec  TR max bryce: 310.8 cm/sec  TR max P.6 mmHg  Pulm Sys Bryce: 45.2 cm/sec  Pulm Boothe Bryce: 58.3 cm/sec  Pulm A Revs Bryce: 31.9 cm/sec  Pulm S/D: 0.78  E/E' av.5  Lateral E/e': 7.9  Medial E/e': 15.0     ______________________________________________________________________________  Report approved by: Greta Kaur 2022 11:28 AM         Glucose by meter   Result Value Ref Range    GLUCOSE BY METER POCT 126 (H) 70 - 99 mg/dL

## 2022-09-07 NOTE — CODE/RAPID RESPONSE
[facility] RRT Note  9/7/2022   Time called: 0134  RRT called for: Chest pain  Code status: Full Code    Assessment & Plan   I was paged to the bedside to evaluate Ms. Smiley Harris for an acute episode of chest pain. Patient is an 82-year-old female with a PMH significant for HTN, Atrial fibrillation with RVR, CAB x3, and bilateral carotid artery stenosis who was admitted on 9/06/2022 for planned right carotid endarterectomy with Dr. Mazariegos.     Asked by RN to assess patient for evolving left side pain. Patient reportedly had this pain following her CEA procedure but it had evolved to feeling like pins/needles and has spread up the left side of her chest. Patient stated that this was similar pain to what she had prior to her CABG x3 in the past. At that point I escalated the call to an RRT as I would need to perform a chest pain workup.    Upon my arrival the patient was supine and sitting upright, she was in no acute distress. Patient stated that the pain was similar to earlier in the day but was now also in the left side of her back. I palpated an area just under her left scapula and was able to reproduce pain that the patient stated was sharp. The pain wrapped around her left ribcage to her sternum. Patient had not received her PRN pain medication since 1800. Patient was very anxious and stated that the tingling feeling in her sternum was similar to what she felt prior to her CABG x3 in the past.    Patient was warm and dry with capillary refill <2s, good pulses in periphery, pale. Patient was not dyspneic, lung sounds clear, normal S1, S2. Vitals stable on 2L NC except hypertensive 185/80.    Diagnosis:  -- Acute pain 2/2 ?procedural positioning  No signs of ACS. Patient was fairly hypertensive in the 180s when I arrived so I ordered a one-time dose of hydralazine as labetalol had been inadequate so far in controlling BP. The dose of nitroglycerin helped with HTN slightly  but did not relieve pain.    Plan:  -- STAT ECG: atrial fibrillation, no ischemic changes noted  -- STAT troponin, CMP, magnesium, CBC, VBG  -- 10-20mg hydralazine IV for SBP >160 after labetalol  -- 0.4mg SL nitroglycerin x1  -- STAT CXR: showed pulmonary venous congestion, BNP 2,200; consider TTE prior to discharge or outpatient, did not diurese as patient had elevated creatinine and negative troponin.  -- Reposition patient and administer PRN analgesia as needed  -- Notify vascular surgery team of overnight events    ADDENDUM:  Patient's pain resolved 30 minutes after oxycodone administration.    At the conclusion of this RRT patient was hemodynamically stable and will remain on current unit.     Her history is significant for:  Past Medical History:   Diagnosis Date     Arthritis      Atrial fibrillation (H)      Bilateral carotid artery stenosis      Depression with anxiety      Edema of lower extremity left      Gastroesophageal reflux disease      Gastroesophageal reflux disease with esophagitis      Hypertension      Mixed hyperlipidemia      Obese      Pes anserinus bursitis      Unspecified cataract      Past Surgical History:   Procedure Laterality Date     ABDOMEN SURGERY      exp lap     CARDIAC SURGERY      CABG     ENDARTERECTOMY CAROTID Left 9/9/2020    Procedure: LEFT CAROTID ENDARTERECTOMY WITH ELECTROENCEPHALOGRAM;  Surgeon: Peter Mazariegos MD;  Location:  OR     ENDARTERECTOMY CAROTID Right 9/6/2022    Preliminary Information:  Procedure: RIGHT CAROTID ENDARTERECTOMY WITH ELECTROENCEPHALOGRAM; with distal facial vein patch;  Surgeon: Peter Mazariegos;  Location:  OR     EYE SURGERY  2019    R cataract extraction     ORTHOPEDIC SURGERY      L TKA       Review of Systems   The 10 point Review of Systems is negative other than noted in the HPI or here.     Allergies   Allergies   Allergen Reactions     Atorvastatin Muscle Pain (Myalgia) and Other (See Comments)     Lisinopril Cough  and Other (See Comments)       Physical Exam   Physical Exam  HENT:      Nose: Nose normal.      Mouth/Throat:      Mouth: Mucous membranes are moist.   Eyes:      Pupils: Pupils are equal, round, and reactive to light.   Cardiovascular:      Rate and Rhythm: Normal rate.      Pulses: Normal pulses.      Heart sounds: Normal heart sounds.   Pulmonary:      Effort: Pulmonary effort is normal.      Breath sounds: Normal breath sounds.   Abdominal:      Palpations: Abdomen is soft.   Musculoskeletal:         General: Normal range of motion.      Cervical back: Normal range of motion. Tenderness present.   Skin:     General: Skin is warm.      Capillary Refill: Capillary refill takes less than 2 seconds.      Coloration: Skin is pale.   Neurological:      General: No focal deficit present.      Mental Status: She is alert and oriented to person, place, and time.   Psychiatric:         Mood and Affect: Mood is anxious.         Vital Signs with Ranges:  Temp:  [97.6  F (36.4  C)-98.6  F (37  C)] (P) 98  F (36.7  C)  Pulse:  [49-87] 72  Resp:  [8-20] 12  BP: (141-192)/() 185/80  MAP:  [80 mmHg-112 mmHg] 84 mmHg  Arterial Line BP: (153-192)/(48-65) 156/48  SpO2:  [89 %-99 %] 96 %  I/O last 3 completed shifts:  In: 1350 [P.O.:250; I.V.:1100]  Out: 335 [Urine:300; Blood:35]    Data   Results for orders placed or performed during the hospital encounter of 09/06/22 (from the past 24 hour(s))   Hemoglobin   Result Value Ref Range    Hemoglobin 12.1 11.7 - 15.7 g/dL   Creatinine   Result Value Ref Range    Creatinine 1.56 (H) 0.52 - 1.04 mg/dL    GFR Estimate 33 (L) >60 mL/min/1.73m2   Potassium   Result Value Ref Range    Potassium 4.5 3.4 - 5.3 mmol/L   Lipid panel   Result Value Ref Range    Cholesterol 210 (H) <200 mg/dL    Triglycerides 140 <150 mg/dL    Direct Measure HDL 49 (L) >=50 mg/dL    LDL Cholesterol Calculated 133 (H) <=100 mg/dL    Non HDL Cholesterol 161 (H) <130 mg/dL    Narrative     Cholesterol  Desirable:  <200 mg/dL    Triglycerides  Normal:  Less than 150 mg/dL  Borderline High:  150-199 mg/dL  High:  200-499 mg/dL  Very High:  Greater than or equal to 500 mg/dL    Direct Measure HDL  Female:  Greater than or equal to 50 mg/dL   Male:  Greater than or equal to 40 mg/dL    LDL Cholesterol  Desirable:  <100mg/dL  Above Desirable:  100-129 mg/dL   Borderline High:  130-159 mg/dL   High:  160-189 mg/dL   Very High:  >= 190 mg/dL    Non HDL Cholesterol  Desirable:  130 mg/dL  Above Desirable:  130-159 mg/dL  Borderline High:  160-189 mg/dL  High:  190-219 mg/dL  Very High:  Greater than or equal to 220 mg/dL   Hemoglobin A1c   Result Value Ref Range    Hemoglobin A1C 5.7 (H) 0.0 - 5.6 %   EKG 12-lead, tracing only   Result Value Ref Range    Systolic Blood Pressure  mmHg    Diastolic Blood Pressure  mmHg    Ventricular Rate 50 BPM    Atrial Rate 50 BPM    KY Interval 164 ms    QRS Duration 84 ms     ms    QTc 408 ms    P Axis 49 degrees    R AXIS 6 degrees    T Axis 29 degrees    Interpretation ECG       Sinus bradycardia with sinus arrhythmia  Cannot rule out Anterior infarct (cited on or before 09-SEP-2020)  Abnormal ECG  When compared with ECG of 09-SEP-2020 09:05,  Nonspecific T wave abnormality, improved in Inferior leads  Nonspecific T wave abnormality no longer evident in Lateral leads     Platelet count   Result Value Ref Range    Platelet Count 281 150 - 450 10e3/uL   Troponin I   Result Value Ref Range    Troponin I High Sensitivity 8 <54 ng/L   Comprehensive metabolic panel   Result Value Ref Range    Sodium 137 133 - 144 mmol/L    Potassium 5.0 3.4 - 5.3 mmol/L    Chloride 109 94 - 109 mmol/L    Carbon Dioxide (CO2) 22 20 - 32 mmol/L    Anion Gap 6 3 - 14 mmol/L    Urea Nitrogen 28 7 - 30 mg/dL    Creatinine 1.54 (H) 0.52 - 1.04 mg/dL    Calcium 8.5 8.5 - 10.1 mg/dL    Glucose 144 (H) 70 - 99 mg/dL    Alkaline Phosphatase 87 40 - 150 U/L    AST 12 0 - 45 U/L    ALT 14 0 - 50 U/L     Protein Total 7.4 6.8 - 8.8 g/dL    Albumin 3.3 (L) 3.4 - 5.0 g/dL    Bilirubin Total 0.7 0.2 - 1.3 mg/dL    GFR Estimate 34 (L) >60 mL/min/1.73m2   CBC with platelets differential    Narrative    The following orders were created for panel order CBC with platelets differential.  Procedure                               Abnormality         Status                     ---------                               -----------         ------                     CBC with platelets and d...[[id number]]  Abnormal            Final result                 Please view results for these tests on the individual orders.   Nt probnp inpatient   Result Value Ref Range    N terminal Pro BNP Inpatient 2,277 (H) 0 - 1,800 pg/mL   Blood gas venous   Result Value Ref Range    pH Venous 7.34 7.32 - 7.43    pCO2 Venous 43 40 - 50 mm Hg    pO2 Venous 49 (H) 25 - 47 mm Hg    Bicarbonate Venous 23 21 - 28 mmol/L    Base Excess/Deficit (+/-) -2.5 -7.7 - 1.9 mmol/L    FIO2 0    Magnesium   Result Value Ref Range    Magnesium 2.1 1.6 - 2.3 mg/dL   CBC with platelets and differential   Result Value Ref Range    WBC Count 12.1 (H) 4.0 - 11.0 10e3/uL    RBC Count 4.09 3.80 - 5.20 10e6/uL    Hemoglobin 12.0 11.7 - 15.7 g/dL    Hematocrit 37.5 35.0 - 47.0 %    MCV 92 78 - 100 fL    MCH 29.3 26.5 - 33.0 pg    MCHC 32.0 31.5 - 36.5 g/dL    RDW 12.6 10.0 - 15.0 %    Platelet Count 281 150 - 450 10e3/uL    % Neutrophils 86 %    % Lymphocytes 5 %    % Monocytes 8 %    % Eosinophils 0 %    % Basophils 0 %    % Immature Granulocytes 1 %    NRBCs per 100 WBC 0 <1 /100    Absolute Neutrophils 10.4 (H) 1.6 - 8.3 10e3/uL    Absolute Lymphocytes 0.6 (L) 0.8 - 5.3 10e3/uL    Absolute Monocytes 1.0 0.0 - 1.3 10e3/uL    Absolute Eosinophils 0.0 0.0 - 0.7 10e3/uL    Absolute Basophils 0.0 0.0 - 0.2 10e3/uL    Absolute Immature Granulocytes 0.1 <=0.4 10e3/uL    Absolute NRBCs 0.0 10e3/uL   XR Chest Port 1 View    Narrative    EXAM: XR CHEST PORT 1 VIEW  LOCATION: [facility]  DATE/TIME: 9/7/2022 2:00 AM    INDICATION: New chest pain  COMPARISON: None.      Impression    IMPRESSION: The heart is enlarged. Median sternotomy wires and surgical clips from prior cardiac surgery are noted. There is mild central pulmonary venous congestion.   EKG 12-lead, tracing only   Result Value Ref Range    Systolic Blood Pressure  mmHg    Diastolic Blood Pressure  mmHg    Ventricular Rate 69 BPM    Atrial Rate 69 BPM    NE Interval 174 ms    QRS Duration 82 ms     ms    QTc 424 ms    P Axis 51 degrees    R AXIS 25 degrees    T Axis 30 degrees    Interpretation ECG       Sinus rhythm with Premature atrial complexes  Low voltage QRS  Borderline ECG  When compared with ECG of 06-SEP-2022 07:21, (unconfirmed)  Premature atrial complexes are now Present  Minimal criteria for Anterior infarct are no longer Present       COVID-19 PCR Results    COVID-19 PCR Results 9/5/20 1/18/22 9/2/22   COVID-19 Virus PCR to [facility] - Result Not Detected     COVID-19 Virus PCR to [facility] - Source Nasopharyngeal     COVID-19 Virus by PCR (External Result)  Negative Negative      Comments are available for some flowsheets but are not being displayed.         COVID-19 Antibody Results, Testing for Immunity    COVID-19 Antibody Results, Testing for Immunity   No data to display.           EKG:  Interpreted by HAIR Grossman CNP  Time reviewed: 0201  Symptoms at time of EKG: Chest pain   Rhythm: atrial fibrillation - controlled  Rate: Normal  Axis: Normal  Ectopy: none  Conduction: normal  ST Segments/ T Waves: No acute ischemic changes  Q Waves: none  Comparison to prior: Unchanged  Clinical Impression: no acute changes    Time Spent on this Encounter   I spent 30 minutes on the unit/floor managing the care of Smiley LOVELACE Kamilahcarlos. Upon evaluation, which required my direct attention, intervention, and personal management. 100% of my time was spent at the bedside counseling the patient  and/or coordinating care regarding services listed in this note.    HAIR Grossman, CNP  Hospitalist - House PRE[facility]  Text me on the [employer] preet for a textback  Text page with web based paging for a callback      Intervention:  - STAT ECG  - STAT Troponin  - Administer PRN pain medications

## 2022-09-07 NOTE — PROGRESS NOTES
Vascular Surgery Progress Note:  POD#1   Right CEA    S: Complaining of left posterior lateral lower chest back pain that was present postoperatively.  Perhaps related to positioning.  No issues with neck incision.  Swallowing well.    O: RRT called this morning due to back issues.  Troponin and EKG negative as expected.      Vitals:  BP  Min: 120/110  Max: 192/75  Temp  Av.1  F (36.7  C)  Min: 97.6  F (36.4  C)  Max: 98.6  F (37  C)  Pulse  Av.5  Min: 49  Max: 87  I/O last 3 completed shifts:  In: 1550 [P.O.:450; I.V.:1100]  Out: 335 [Urine:300; Blood:35]    Physical Exam: Overall comfortable.  Reports back issues are better.  Does complain of occasional tremor of her hands that we noticed postoperatively on her check last evening.    Neck incision=A   CN XII and Neuro=A    laboratory: SCr= 1.54 (chronic) elevated BNP at 2277    A1c= 5.7 LDL= 133      Assessment/Plan: #1.  Doing well following right CEA.  No new neurological issues.  Has been on chronic aspirin.  Initiate Plavix for 1 month.     #2.  Posterior lateral chest/back pain.  Likely positional.  Not related to cardiac issue.     #3.  More hypertensive upon induction and postoperatively.  However blood pressure presently 120/40.     #4.   Perhaps mild CHF with fluid overload.  Voiding on own.  Stable chronic renal insufficiency.  Will monitor.     #5.  Elevated LDL.  May require adjustments of medications--on Crestor 10 mg daily presently     #6.   History of atrial fibrillation but per patient no longer on chronic anticoagulation.    Very likely patient would be able to go home today due to above issues.  Regular diet.  Ambulate.    Wm. Yair MD

## 2022-09-07 NOTE — PROGRESS NOTES
VASCULAR SURGERY    Patient is doing better this afternoon.  Her left back pain is improved with lidocaine gel.      She does have an intentional tremor of her upper extremities--no clinical concern.    Patient has chronic atrial fibrillation.  She had been on Eliquis in the past but discontinued this due to cost issues.  Her physician was discussing another medication but she is unsure what this was.  We will try to check into this and she will be discharged on this medication along with a baby aspirin.    Due to her multiple issues patient will stay with us in the hospital till tomorrow morning and be discharged to home with her family.      Peter Mazariegos MD

## 2022-09-07 NOTE — PLAN OF CARE
Reason for Admission: R CEA POD 0    Cognitive/Mentation: A/Ox 4  Neuros/CMS: Intact ex occasional photophobia  VS: Stable ex intermittent HTN. Tele: NSR.  GI: BS +, + flatus, last BM today. Continent.  : Voids without issue. Continent.  Pulmonary: LS clear, on 2 L NC.  Pain: Incisional pain and back pain, oxycodone and tylenol given.     Drains: None  Skin: R CEA site CDI, minimal swelling  Activity: Assist x 1 with GBW.  Diet: Regular with thin liquids. Takes pills whole.     Aggression Stoplight Score: Green  Therapies recs: Pending  Discharge: Pending    End of shift summary: IMC vitals and neuros continued, no neuro changes noted. NS running at 70 mL/hr, patient continues to have minimal fluid intake d/t sore throat. 10 mg labetalol given for SBP >160.

## 2022-09-07 NOTE — PLAN OF CARE
POD1 of R CEA. A&Ox4. Neuros intact. VS: hypertensive, labetolol was ineffective with S<160. Pt also c/o L side pain that radiates to back with pins and needle on chest pain, pt stated symptoms were the same when pt had CABGx3. See RRT note. Hydralazine 1xdose given, BP currently 129/41. Up with A1 and cane. Voiding without difficulty. No bm, passing flatus. Regular diet. Oxycodone given for pain, with some relief. Pt anxious and currently has tremors on LUE. Pt scoring green on the aggression stoplight tool. Discharge home pending. Continue to monitor.

## 2022-09-08 ENCOUNTER — APPOINTMENT (OUTPATIENT)
Dept: CARDIOLOGY | Facility: CLINIC | Age: 82
DRG: 038 | End: 2022-09-08
Attending: PHYSICIAN ASSISTANT
Payer: MEDICARE

## 2022-09-08 VITALS
BODY MASS INDEX: 43.84 KG/M2 | HEART RATE: 52 BPM | RESPIRATION RATE: 18 BRPM | WEIGHT: 238.2 LBS | SYSTOLIC BLOOD PRESSURE: 145 MMHG | OXYGEN SATURATION: 96 % | HEIGHT: 62 IN | TEMPERATURE: 97.9 F | DIASTOLIC BLOOD PRESSURE: 53 MMHG

## 2022-09-08 LAB
ALBUMIN SERPL-MCNC: 3.1 G/DL (ref 3.4–5)
ALP SERPL-CCNC: 83 U/L (ref 40–150)
ALT SERPL W P-5'-P-CCNC: 12 U/L (ref 0–50)
ANION GAP SERPL CALCULATED.3IONS-SCNC: 8 MMOL/L (ref 3–14)
AST SERPL W P-5'-P-CCNC: 24 U/L (ref 0–45)
ATRIAL RATE - MUSE: 50 BPM
ATRIAL RATE - MUSE: 69 BPM
BILIRUB DIRECT SERPL-MCNC: <0.1 MG/DL (ref 0–0.2)
BILIRUB SERPL-MCNC: 0.4 MG/DL (ref 0.2–1.3)
BUN SERPL-MCNC: 34 MG/DL (ref 7–30)
CALCIUM SERPL-MCNC: 8.2 MG/DL (ref 8.5–10.1)
CHLORIDE BLD-SCNC: 106 MMOL/L (ref 94–109)
CO2 SERPL-SCNC: 21 MMOL/L (ref 20–32)
CREAT SERPL-MCNC: 1.7 MG/DL (ref 0.52–1.04)
DIASTOLIC BLOOD PRESSURE - MUSE: NORMAL MMHG
DIASTOLIC BLOOD PRESSURE - MUSE: NORMAL MMHG
ERYTHROCYTE [DISTWIDTH] IN BLOOD BY AUTOMATED COUNT: 13.2 % (ref 10–15)
GFR SERPL CREATININE-BSD FRML MDRD: 30 ML/MIN/1.73M2
GLUCOSE BLD-MCNC: 99 MG/DL (ref 70–99)
HCT VFR BLD AUTO: 38.3 % (ref 35–47)
HGB BLD-MCNC: 11.7 G/DL (ref 11.7–15.7)
INTERPRETATION ECG - MUSE: NORMAL
INTERPRETATION ECG - MUSE: NORMAL
MCH RBC QN AUTO: 29.5 PG (ref 26.5–33)
MCHC RBC AUTO-ENTMCNC: 30.5 G/DL (ref 31.5–36.5)
MCV RBC AUTO: 97 FL (ref 78–100)
NT-PROBNP SERPL-MCNC: 6755 PG/ML (ref 0–1800)
P AXIS - MUSE: 49 DEGREES
P AXIS - MUSE: 51 DEGREES
PLATELET # BLD AUTO: 296 10E3/UL (ref 150–450)
POTASSIUM BLD-SCNC: 4.7 MMOL/L (ref 3.4–5.3)
PR INTERVAL - MUSE: 164 MS
PR INTERVAL - MUSE: 174 MS
PROT SERPL-MCNC: 7.1 G/DL (ref 6.8–8.8)
QRS DURATION - MUSE: 82 MS
QRS DURATION - MUSE: 84 MS
QT - MUSE: 396 MS
QT - MUSE: 448 MS
QTC - MUSE: 408 MS
QTC - MUSE: 424 MS
R AXIS - MUSE: 25 DEGREES
R AXIS - MUSE: 6 DEGREES
RBC # BLD AUTO: 3.97 10E6/UL (ref 3.8–5.2)
SODIUM SERPL-SCNC: 135 MMOL/L (ref 133–144)
SYSTOLIC BLOOD PRESSURE - MUSE: NORMAL MMHG
SYSTOLIC BLOOD PRESSURE - MUSE: NORMAL MMHG
T AXIS - MUSE: 29 DEGREES
T AXIS - MUSE: 30 DEGREES
VENTRICULAR RATE- MUSE: 50 BPM
VENTRICULAR RATE- MUSE: 69 BPM
WBC # BLD AUTO: 8.9 10E3/UL (ref 4–11)

## 2022-09-08 PROCEDURE — 250N000013 HC RX MED GY IP 250 OP 250 PS 637: Performed by: SURGERY

## 2022-09-08 PROCEDURE — 250N000011 HC RX IP 250 OP 636: Performed by: SURGERY

## 2022-09-08 PROCEDURE — 93248 EXT ECG>7D<15D REV&INTERPJ: CPT | Performed by: INTERNAL MEDICINE

## 2022-09-08 PROCEDURE — 99233 SBSQ HOSP IP/OBS HIGH 50: CPT | Performed by: PHYSICIAN ASSISTANT

## 2022-09-08 PROCEDURE — 82248 BILIRUBIN DIRECT: CPT | Performed by: HOSPITALIST

## 2022-09-08 PROCEDURE — 99223 1ST HOSP IP/OBS HIGH 75: CPT | Mod: 25 | Performed by: INTERNAL MEDICINE

## 2022-09-08 PROCEDURE — 93242 EXT ECG>48HR<7D RECORDING: CPT

## 2022-09-08 PROCEDURE — 36415 COLL VENOUS BLD VENIPUNCTURE: CPT | Performed by: PHYSICIAN ASSISTANT

## 2022-09-08 PROCEDURE — 83880 ASSAY OF NATRIURETIC PEPTIDE: CPT | Performed by: PHYSICIAN ASSISTANT

## 2022-09-08 PROCEDURE — 82310 ASSAY OF CALCIUM: CPT | Performed by: PHYSICIAN ASSISTANT

## 2022-09-08 PROCEDURE — 99024 POSTOP FOLLOW-UP VISIT: CPT

## 2022-09-08 PROCEDURE — 85027 COMPLETE CBC AUTOMATED: CPT | Performed by: PHYSICIAN ASSISTANT

## 2022-09-08 RX ORDER — CLOPIDOGREL BISULFATE 75 MG/1
75 TABLET ORAL DAILY
Qty: 30 TABLET | Refills: 3 | Status: SHIPPED | OUTPATIENT
Start: 2022-09-08 | End: 2022-10-03

## 2022-09-08 RX ORDER — ROSUVASTATIN CALCIUM 20 MG/1
20 TABLET, COATED ORAL EVERY EVENING
Status: DISCONTINUED | OUTPATIENT
Start: 2022-09-08 | End: 2022-09-08 | Stop reason: HOSPADM

## 2022-09-08 RX ORDER — ROSUVASTATIN CALCIUM 20 MG/1
20 TABLET, COATED ORAL EVERY EVENING
Qty: 30 TABLET | Refills: 3 | Status: SHIPPED | OUTPATIENT
Start: 2022-09-08

## 2022-09-08 RX ADMIN — HYDROCHLOROTHIAZIDE 25 MG: 25 TABLET ORAL at 09:29

## 2022-09-08 RX ADMIN — SENNOSIDES AND DOCUSATE SODIUM 1 TABLET: 50; 8.6 TABLET ORAL at 09:28

## 2022-09-08 RX ADMIN — HEPARIN SODIUM 5000 UNITS: 5000 INJECTION, SOLUTION INTRAVENOUS; SUBCUTANEOUS at 05:27

## 2022-09-08 RX ADMIN — LOSARTAN POTASSIUM 50 MG: 50 TABLET, FILM COATED ORAL at 09:29

## 2022-09-08 RX ADMIN — ACETAMINOPHEN 975 MG: 325 TABLET ORAL at 15:09

## 2022-09-08 RX ADMIN — CLOPIDOGREL BISULFATE 75 MG: 75 TABLET ORAL at 09:30

## 2022-09-08 RX ADMIN — ASPIRIN 81 MG: 81 TABLET, COATED ORAL at 09:29

## 2022-09-08 RX ADMIN — ACETAMINOPHEN 975 MG: 325 TABLET ORAL at 05:27

## 2022-09-08 RX ADMIN — LEVOTHYROXINE SODIUM 25 MCG: 25 TABLET ORAL at 05:27

## 2022-09-08 ASSESSMENT — ACTIVITIES OF DAILY LIVING (ADL)
ADLS_ACUITY_SCORE: 26
ADLS_ACUITY_SCORE: 26
ADLS_ACUITY_SCORE: 24
ADLS_ACUITY_SCORE: 26
ADLS_ACUITY_SCORE: 24
ADLS_ACUITY_SCORE: 24
ADLS_ACUITY_SCORE: 26
ADLS_ACUITY_SCORE: 24

## 2022-09-08 NOTE — PROGRESS NOTES
Vascular Surgery Progress Note:  POD#2   Right CEA    S: Much more comfortable today.  Back pain is better and almost resolved.  Tremors have also resolved.  Does complain of a nonproductive cough and a sore throat from intubation.    O:   Vitals:  BP  Min: 92/67  Max: 197/70  Temp  Av.8  F (36  C)  Min: 95.2  F (35.1  C)  Max: 97.3  F (36.3  C)  Pulse  Av.5  Min: 52  Max: 127  No intake/output data recorded.    Physical Exam: Sitting up in bed.  Alert and appropriate.  Not coughing.   Very comfortable   Wd=A with mild bruising but very soft.   No intentional tremor today.      Assessment/Plan: #1.  Doing well following right CEA.     #2.  Higher blood pressure but, now in the range of 150 systolic.     #3.  Chronic atrial fibrillation.  Had been on Eliquis in the past but discontinued this due to cost.  Reports that she is on a different anticoagulant however as we checked the med rec there is no mention of this.  This will need to be addressed with her primary care physician    She will be discharged on aspirin and Plavix with follow-up with me in 2 to 3 weeks       Wm. MD Yair

## 2022-09-08 NOTE — DISCHARGE INSTRUCTIONS
Follow up:     Patient education:   Follow up in 2 weeks  No driving while on narcotics  Avoid strenuous activity for 3 days  You are allowed to shower when you get home, no scrubbing the incision, clean and pat dry.   -If you develop headaches or  high blood pressure please notify the clinic immediately        An appointment is scheduled with Nina Singh NP at the [facility] ([phone]) for Thursday September 15 at 11:30 a.m.     An appointment is scheduled with Nessa Carballo NP at [facility] ([phone]) for Tuesday September 27 at 1:00 p.m.

## 2022-09-08 NOTE — PLAN OF CARE
Reason for Admission: POD 2 R CEA  Cognitive/Mentation: A/Ox 4  Neuros/CMS: Stable with generalized weakness.  VS: VSS on RA   Tele: A fib SVR  GI: BS audible, + flatus, no BM this shift. Continent.  : Voiding adequately. Continent.  Pulmonary: LS clear, frequent productive cough.   Pain: Incision and back soreness, scheduled Tylenol given. Lidocaine cream available.   Drains/Lines: PIV SL  Skin: Intact, R CEA incision adhesive strips CDI with minimal swelling.   Activity: Up SBA with cane  Diet: Regular diet with thin liquids. Takes pills whole.   Therapies recs: Home  Discharge: Plan to discharge today.   Aggression Stoplight Tool: Green   End of shift summary: Neuros stable overnight. Pt had some anxiety, one dose of Ativan given before bed.

## 2022-09-08 NOTE — PROGRESS NOTES
CM Note:    Hospitalist requests follow up appointments (PCP and Cardiology).  Appointments scheduled.    An appointment is scheduled with Nina Singh NP at the [facility] ([phone]) for Thursday September 15 at 11:30 a.m.    An appointment is scheduled with Nessa Carballo NP at [facility] ([phone]) for Tuesday September 27 at 1:00 p.m.    Jazzmine Palumbo RN, BSN, PHN  Inpatient Care Coordination  [facility]  Phone: [phone]

## 2022-09-08 NOTE — PROGRESS NOTES
Spoke with hospitalist re: discharge plan.  Awaiting morning labs and pharmacy liaison input, will await discharge planning when medically stable by hospitalist. Will get weight on patient this am and reassess patient status.

## 2022-09-08 NOTE — PLAN OF CARE
Goal Outcome Evaluation:    Plan of Care Reviewed With: patient, son     POD 2 right CEA. Neuros/CMS - alert & oriented, letting her needs be known; anxious at times/generalized weakness/steady gait; no dizziness or lightheadedness; no shortness of breath; accumulation of phlegm cleared spontaneously after hot liquids/tolerating fine. VSS, RA.  Tele - sinus bradycardia with pvcs (md aware).  2 gram Na diet/set up provided/meds whole with water. Up with assist/gait belt & cane helpful.  Continent of bowel and bladder; bm yesterday. Left posterior back discomfort/tylenol provided & helpful/tolerated up in chair for most of shift/ambulated short walks & to bathroom.  Pt scoring green on the Aggression Stop Light Tool. Pending discharge to home via son Tyson transporting - friend coming to stay with patient post-op/awaiting cardiology transport.

## 2022-09-08 NOTE — CONSULTS
[facility]  Cardiology Consultation     Date of Admission:  9/6/2022  Date of Consult (When I saw the patient): 09/08/22  Reason for Consult: elevated BNP, atypical chest pain, and question on anticoagulation    Primary Cardiologist: Dr. Bradshaw at [facility]    -------------------------------------------------------------------------------------------    Plan:  -- Agree with anticoagulation for thromboembolic prophylaxis; she agrees to start Eliquis  -- Continue with metoprolol for rate control  -- 7-day Zio patch monitor on discharge  -- Despite elevated NT proBNP she appears euvolemic on physical exam thus will not make any medication changes, recommend repeat NT proBNP as outpatient  -- Follow-up with primary cardiology team at [facility] (Dr. Bradshaw)  -- Cardiology will sign off    -------------------------------------------------------------------------------------------    Assessment:  Smiley Harris is a 82 year old female who was admitted on 9/6/2022 right carotid endarterectomy.    #S/P right carotid endarterectomy  - Doing well with no complications  - On Plavix and aspirin currently  - Follows with vascular surgery    #Atypical chest pain (resolved)  - On exam it is clearly musculoskeletal  - Troponins, ECG, echo all unremarkable    #Paroxysmal atrial fibrillation  - This was first documented following her CABG  - She was on Eliquis as well as AV node blocking agent but her Eliquis was discontinued later on for unknown reasons  - On current admission she has recurrence of atrial fibrillation with controlled ventricular response  - Given her elevated [id number] score she should be on anticoagulation  - Patient is okay with initiating Eliquis as she did well on this before  - She has severe LAE suggesting her atrial fibrillation is chronic    #CAD  - History of CABG x3 in 6/2020    #Acute on chronic kidney disease  -- Cr 1.7 today; it appears her baseline is 1.5-1.6 per  "CareEverywhere    #Hypertension  - Under good control     #Hyperlipidemia  -- Previously under good control but admission lipid panel showed LDL of 133  -- Her PTA rosuvastatin was increased from 10 mg to 20 mg    Clinically Significant Risk Factors Present on Admission   # Severe Obesity: Estimated body mass index is 43.57 kg/m  as calculated from the following:    Height as of this encounter: 1.575 m (5' 2\").    Weight as of this encounter: 108 kg (238 lb 3.2 oz).  Cardiovascular: Peripheral vascular disease (PVD)  Fluid & Electrolyte Disorders: Fluid overload, unspecified  Nephrology: CKD POA List: Stage 3a (GFR 45-59)  Systemic: Chronic Fatigue and Other Debilities: Chronic fatigue, unspecified     -------------------------------------------------------------------------------------------    History of Present Illness   Smiley Harris is a 82 year old female who presents with elective right carotid endarterectomy with distal facial vein patch angioplasty on 9/2022.    Smiley's past medical history is notable for carotid artery disease (history of left carotid stenosis s/p endarterectomy), CAD (CABG x3 in 6/2020; had LIMA to LAD, SVG to OM, SVG to PDA; done at [facility]), hypertension, hyperlipidemia, history of atrial fibrillation following CABG (it sounds like she was on rate control as well as Eliquis for thromboembolic prophylaxis but this was later discontinued as it was felt that her atrial fibrillation was provoked; of note her echocardiogram shows severe left atrium enlargement suggesting her atrial fibrillation is likely chronic), and CKD (baseline creatinine 1.5-1.6).    Smiley reported chest discomfort 2 days ago which prompted him to check echocardiogram, troponins, as well as ECG.  Her troponins remain negative.  Echocardiogram showed preserved LVEF with no valve issues.  There were no wall motion normalities.  ECG showed atrial fibrillation with controlled ventricular rhythm.  She was also having " intermittent episodes of shortness of breath thus chest x-ray and NT proBNP were ordered.  Chest x-ray showed mild vascular congestion and NT proBNP was elevated at 6000.  She was given one-time IV Lasix with good response.  There was also question of whether to initiate anticoagulation now versus outpatient for her recurrent atrial fibrillation.  Cardiology was consulted for further recommendations prior to her discharge.    Today, Smiley reports feeling much better.  She denies recurrent chest discomfort.  She feels her chest discomfort was musculoskeletal in etiology.  She has had this discomfort on and off since her CABG and during her carotid endarterectomy procedure she was lying more so on her left side for a prolonged period of time which she feels may have triggered her symptoms.  She has been walking around in her room with no exertional symptoms.  She denies associated nausea or diaphoresis.  If she presses on her left side she can reproduce this pain.  She denies orthopnea, abdominal distention, or peripheral edema.  Overall she thinks she is doing well.  Her throat seems to be sore and she feels like her throat is a bit swollen since her carotid procedure but hot tea is helping with this.      Code Status    No CPR- Do NOT Intubate    Past Medical History   Past Medical History:   Diagnosis Date     Arthritis      Atrial fibrillation (H)      Bilateral carotid artery stenosis      Depression with anxiety      Edema of lower extremity left      Gastroesophageal reflux disease      Gastroesophageal reflux disease with esophagitis      Hypertension      Mixed hyperlipidemia      Obese      Pes anserinus bursitis      Unspecified cataract        Past Surgical History   Past Surgical History:   Procedure Laterality Date     ABDOMEN SURGERY      exp lap     CARDIAC SURGERY      CABG     ENDARTERECTOMY CAROTID Left 9/9/2020    Procedure: LEFT CAROTID ENDARTERECTOMY WITH ELECTROENCEPHALOGRAM;  Surgeon: Yair  Peter Lopez MD;  Location:  OR     ENDARTERECTOMY CAROTID Right 9/6/2022    Procedure: RIGHT CAROTID ENDARTERECTOMY WITH ELECTROENCEPHALOGRAM; with distal facial vein patch;  Surgeon: Peter Mazariegos;  Location:  OR     EYE SURGERY  2019    R cataract extraction     ORTHOPEDIC SURGERY      L TKA       Prior to Admission Medications   Prior to Admission Medications   Prescriptions Last Dose Informant Patient Reported? Taking?   aspirin 81 MG tablet 9/6/2022 at AM Self Yes Yes   Sig: Take 1 tablet by mouth daily.   hydrochlorothiazide (HYDRODIURIL) 25 MG tablet 9/5/2022 at AM Self Yes Yes   Sig: Take 25 mg by mouth every morning   levothyroxine (SYNTHROID/LEVOTHROID) 25 MCG tablet 9/5/2022 at AM Self Yes Yes   Sig: Take 25 mcg by mouth every morning   levothyroxine (SYNTHROID/LEVOTHROID) 25 MCG tablet   Yes Yes   Sig: Take 25 mcg by mouth   losartan (COZAAR) 50 MG tablet 9/5/2022 at AM Self Yes Yes   Sig: Take 50 mg by mouth every morning   metoprolol tartrate (LOPRESSOR) 25 MG tablet 9/6/2022 at AM Self Yes Yes   Sig: Take 25 mg by mouth 2 times daily   omeprazole (PRILOSEC) 20 MG DR capsule   Yes No   Sig: Take 20 mg by mouth   rosuvastatin (CRESTOR) 10 MG tablet 9/5/2022 at HS Self Yes Yes   Sig: Take 10 mg by mouth every other day      Facility-Administered Medications: None     Allergies   Allergies   Allergen Reactions     Atorvastatin Muscle Pain (Myalgia) and Other (See Comments)     Lisinopril Cough and Other (See Comments)       Social History    Smiley Harris  reports that she has never smoked. She has never used smokeless tobacco. She reports that she does not drink alcohol and does not use drugs.    Family History   Family History   Problem Relation Age of Onset     Cancer Father        --------------------------------------------------------------------------------------------    Review of Systems   The 10 point Review of Systems is negative other than noted in the HPI or here.      Temp:  [97.2  F (36.2  C)-97.9  F (36.6  C)] 97.9  F (36.6  C)  Pulse:  [] 52  Resp:  [15-18] 18  BP: ()/(51-68) 145/53  SpO2:  [94 %-97 %] 96 %  238 lbs 3.2 oz    Constitutional: NAD. Comfortable at rest. Cooperative, alert and oriented. Her son at bedside.  Eyes: Pupils equal and round, conjunctivae and lids unremarkable, sclera white, no xanthalasma,   ENT: Satisfactory dentition.  No cyanosis or pallor.  Neck: No thyromegaly.     Respiratory: Clear to auscultation bilaterally.  Cardiovascular: Irregularly irregular rhythm, normal rate.  No murmur rub or gallop.  GI: Soft, nontender, no hepatosplenomegaly, no masses, active bowel sounds.    Skin: No rash, erythema, ecchymosis.  Musculoskeletal: Normal muscle tone and strength. Normal gait. No spinal deformities.  Neuropsychiatric: Oriented to time place and person.  Affect normal.  No gross motor deficits.  Extremities: No pitting edema.    Data   Reviewed.     Telemetry: Atrial fibrillation with controlled ventricular response    Elza Major PA-C   9/8/2022  Pager: [phone]

## 2022-09-08 NOTE — CONSULTS
Patient has Medicare D through [employer] with $480 (of $480) unmet deductible.    Note: Patient's history shows she fills RXs at [facility].  She could save money by switching to a planned preferred pharmacy ([facility], [facility], [facility], [facility]).     Xarelto/Eliquis  Sept: Upon receipt of RX, [facility] can dispense 1 month free.   Oct: $526 (fulfills $480 deductible)   Nov-Dec: $46/mo ($29/mo at [facility], [facility], [facility], [facility])      Jantoven (warfarin): $6/mo ($1/mo at [facility], [facility], [facility], [facility])       Simona Mccabe  Pharmacy Technician/Liaison, Discharge Pharmacy   [phone]  [email or web address]    Addendum:  Patient has used 1 mo free voucher for Eliquis in the past, and is not eligible for another.

## 2022-09-08 NOTE — PROGRESS NOTES
[facility]    Medicine Progress Note - Hospitalist Service    Date of Admission:  9/6/2022    Assessment & Plan   Smiley Harris is a 82 year old female with PMH significant for CAD s/p CABG, HTN, HLD, hx Afib post CABG, CKD3, hypothyroidism, obesity class III, GERD, depression, anxiety, OA, and left carotid stenosis s/p CEA who was admitted to [facility] on 9/6/2022 by the vascular surgery service for Rt CEA due to progressive high grade asymptotic calcified right internal carotid artery stenosis.     Acute postoperative chest pain, improving  Elevated proBNP  RRT called early a.m. 9/7 for chest pain after surgery the day prior.  Patient with extensive cardiac history including CAD status post three-vessel CABG, atrial fibrillation with RVR, hypertension. The patient reported left sided pain feeling like pins/needlings following the procedure, which is not radiating to the left side of her chest and to the back. Pt endorsed the pain is similar to what she had prior to her CABGx3. Workup during RRT significant for stable Cr 1.54, normal liver enzymes, normal troponin 8, proBNP 2,277 --> trending up to 6,755. WBC 12.1, ANC 10.4. CXR with cardiomegaly, mild central pulmonary venous congestion. Pain is reproducible, improving and with negative cardiac workup and normal troponin not likely to be cardiac related. Will give 1 time low dose IV Lasix due to elevated proBNP and difficult to see RV/filling on ECHO, 1.1L IVF perioperatively, however the patient is not hypoxic, no rales, and BLE are generalized edema without any pitting, she does not appear overtly fluid overloaded on exam and her Creatinine is 1.5 which is baseline therefore will hold off on any additional diuretics or higher doses from her hydrochlorothiazide and 1 time dose Lasix unless warranted.  - Telemetry Afib with CVR/Afib with RVR last night  - Recheck troponins - negative  - ECHO - EF 55-60%, LA  severely dilated, mild to mod TR, mild pulmonary HTN, indeterminate LV filling pressures, RV fxn cannot be assessed due to poor image quality.  - Given 1 time low dose IV furosemide 20mg x1 9/7  - Pain control  - Daily weights, I/Os  - Compression stockings  - 2 gram sodium diet  - Asked cardiology to weigh in given uptrending proBNP, Afib with RVR postop day #1 (historically only post CABG), need for AC, and medication optimization, close cardiology follow up. They have ordered 7 day Zio patch, recommend restarting Eliquis and continuing current cardiac meds. Follow up with Dr. Bradshaw at [facility] as well as PCP for repeat BMP within 1 week. Appts scheduled by CC.  - Ok to discharge to home from Hospitalist perspective    Intake/Output Summary (Last 24 hours) at 9/8/2022 1009  Last data filed at 9/8/2022 0811  Gross per 24 hour   Intake 50 ml   Output --   Net 50 ml       Vitals:    09/06/22 0612 09/08/22 0809   Weight: 107.6 kg (237 lb 4.8 oz) 108 kg (238 lb 3.2 oz)       Wt Readings from Last 4 Encounters:   09/06/22 107.6 kg (237 lb 4.8 oz)   09/12/20 108.2 kg (238 lb 8.6 oz)   10/25/18 95.3 kg (210 lb)   09/27/13 103.2 kg (227 lb 8 oz)       Progressive high-grade asymptomatic calcified right internal carotid artery stenosis status post endarterectomy with distal facial vein patch angioplasty 9/6/2022   History of left carotid stenosis s/p endarterectomy  Right CEA with Dr. Mazariegos 9/6. GETA. EBL 35 mL. No immediate postoperative complications.  - Routine postoperative cares per primary service, including IVF, pain control, abx, DVT ppx, and disposition  - Aggressive pulmonary toilet, frequent IS use 10x/hour while awake  - Monitor Hgb  - Disposition per primary team  - Plan to change to Plavix + Eliquis2.5mg BID on discharge (drop ASA for now), follow up with Dr. Mazariegos for further recommendations, appt scheduled 9/22    Recent Labs   Lab 09/07/22  0159 09/06/22  0608   HGB 12.0 12.1       Coronary artery disease s/p  3v-CABG 6/2020  Benign essential hypertension  Hyperlipidemia  Hx LIMA to LAD, SVG to OM, SVT go PDA. Surgery by Dr. Monroy at [facility].   PTA Regimen: hydrochlorothiazide 25mg/d, losartan 50mg/d, metoprolol tartrte 25mg/d, rosuvastatin 10mg every other night  EKG: nonischemic  Last ECHO 2020: EF >55%  Follows with cardiology at [facility], Dr. Jean Bradshaw. Last visit June 2022.  FLP: , HDL 49,   - Telemetry  - Continue PTA ARB, BB, statin, and thiazide  - , recommend increasing rosuvastatin to 20mg Q HS, recheck in 3-6 months with PCP or Cardiology for any further mediation adjustments. Monitor tolerance given myalgias with atorvastatin in the past.  - Continue PTA antihypertensives with hold parameters  - PRN IV hydralazine available for SBP >180  - Monitor BP trend and need for medication adjustments    Hx paroxysmal atrial fibrillation, not on chronic anticoagulation  Rate controlled with metoprolol tartrate 25mg BID.  Not anticoagulated presently, previously on Eliquis and was to start a different medication. Vascular surgery looking into this to decide on DVT ppx postop and ongoing.  * Went into Afib with RVR briefly on 9/7 evening, given 1 dose of IV metoprolol 2.5mg x1 with good effect.   - Telemetry  - Continue PTA beta blocker with hold paramters  - PRN IV metoprolol for sustained heart rate >120  - Monitor K+/Mg++, replace PRN  - Plan to change to Plavix + Eliquis2.5mg BID on discharge (drop ASA for now), follow up with Dr. Mazariegos for further recommendations, appt scheduled 9/22    Chronic kidney disease, stage 3  Baseline Cr 1.5-1.6. On admission, at baseline.  - Avoid nephrotoxins - contrast, NSAIDs  - Renally dose medications as able/needed  - Monitor  - Increased to 1.7 after episode of Afib with RVR, soft BP, and 1 dose of low dose IV Lasix 20mg. Baseline is 1.5-1.6, not a true FLO. Repeating BMP next week with PCP.    Hypothyroidism  - Continue PTA TRT  - TSH 0.99  "WNL    Prediabetes  HbA1c 5.7%.  - Follow up with PCP, recheck in 6 months    Other pertinent history and chronic stable diagnoses: Appropriate PTA medications will be resumed.  Obesity class III: Body mass index is 43.4 kg/m . Increase in all cause mortality. Close follow up with PCP, diet and lifestyle changes  GERD: Continue PTA omeprazole 20mg/d  Depression  Anxiety  OA s/p L TKA  Recurrent LLE cellulitis  Rt knee OA      Diet: 2 Gram Sodium Diet  Diet    DVT Prophylaxis: Defer to primary service  Simon Catheter: Not present  Code Status: No CPR- Do NOT Intubate      The patient's care was discussed with the Attending Physician, Dr. Ortiz, Patient and Patient's Family.    KATELYNN Martell PA-C  Hospitalist [facility]  [facility]  Securely message with the Vocera Web Console (learn more here)  Text page via [employer]  ______________________________________________________________________         Diet: 2 Gram Sodium Diet  Diet    DVT Prophylaxis: DOAC + Plavix  Simon Catheter: Not present  Central Lines: None  Cardiac Monitoring: ACTIVE order. Indication: Chest pain/ ACS rule out (24 hours)  Code Status: No CPR- Do NOT Intubate      Disposition Plan      Expected Discharge Date: 09/08/2022,  9:00 AM              The patient's care was discussed with the Attending Physician, Dr. Lock, Bedside Nurse, Patient, Patient's Family and Cardiology Consultant and primary team.    Farhana Dwyer PA-C  Hospitalist Service  [facility]  Securely message with the Vocera Web Console (learn more here)  Text page via [employer]         Clinically Significant Risk Factors Present on Admission               # Severe Obesity: Estimated body mass index is 43.4 kg/m  as calculated from the following:    Height as of this encounter: 1.575 m (5' 2\").    Weight as of this encounter: 107.6 kg (237 lb 4.8 oz).    "     ______________________________________________________________________    Interval History   Seen and examined. CP resolving, musculoskeletal in nature. No current SOB, lightheadedness, dizziness, or palpations. Reports doing much better today. Addressed AC. Asked cardiology to evaluate given increasing BNP, AFib with RVR last night. Cleared for discharge by them. AC plan in place with vascular surgery and cardiology. Questions welcomed and answered to the best of my knowledge.    Data reviewed today: I reviewed all medications, new labs and imaging results over the last 24 hours. I personally reviewed no images or EKG's today.    Physical Exam   Vital Signs: Temp: 97.2  F (36.2  C) Temp src: Oral BP: (!) 149/57 Pulse: 52   Resp: 18 SpO2: 97 % O2 Device: None (Room air)    Weight: 237 lbs 4.8 oz    General: Awake, alert, very pleasant talkative woman who appears stated age. Looks comfortable sitting up in bed. No acute distress.  HEENT: Normocephalic, atraumatic. Extraocular movements intact. Rt CEA dressing intact.  Respiratory: Clear to auscultation bilaterally, no rales, wheezing, or rhonchi.  Cardiovascular: Regular rate and rhythm, +S1 and S2, no murmur auscultated. Generalized nonpitting peripheral edema.   Gastrointestinal: Soft, non-tender, obese but non-distended. Bowel sounds present.  Skin: Warm, dry. No obvious rashes or lesions on exposed skin. Dorsalis pedis pulses palpable bilaterally.  Musculoskeletal: No joint swelling, erythema or tenderness. Moves all extremities equally.  Neurologic: AAO x3. Cranial nerves 2-12 grossly intact, normal strength and sensation.  Psychiatric: Appropriate mood and affect. No obvious anxiety or depression.    Data   Recent Labs   Lab 09/08/22  0833 09/07/22  1625 09/07/22  1152 09/07/22  0647 09/07/22  0159 09/06/22  1322 09/06/22  0608   WBC 8.9  --   --   --  12.1*  --   --    HGB 11.7  --   --   --  12.0  --  12.1   MCV 97  --   --   --  92  --   --       --   --   --  281 281  --      --   --   --  137  --   --    POTASSIUM 4.7  --   --   --  5.0  --  4.5   CHLORIDE 106  --   --   --  109  --   --    CO2 21  --   --   --  22  --   --    BUN 34*  --   --   --  28  --   --    CR 1.70*  --   --   --  1.54*  --  1.56*   ANIONGAP 8  --   --   --  6  --   --    GENEVIEVE 8.2*  --   --   --  8.5  --   --    GLC 99 130* 126*   < > 144*  --   --    ALBUMIN 3.1*  --   --   --  3.3*  --   --    PROTTOTAL 7.1  --   --   --  7.4  --   --    BILITOTAL 0.4  --   --   --  0.7  --   --    ALKPHOS 83  --   --   --  87  --   --    ALT 12  --   --   --  14  --   --    AST 24  --   --   --  12  --   --     < > = values in this interval not displayed.     No results found for this or any previous visit (from the past 24 hour(s)).  Medications       acetaminophen  975 mg Oral Q8H     aspirin  81 mg Oral Daily     clopidogrel  75 mg Oral Daily     gabapentin  100 mg Oral At Bedtime     heparin ANTICOAGULANT  5,000 Units Subcutaneous Q8H     hydrochlorothiazide  25 mg Oral QAM     levothyroxine  25 mcg Oral QAM     losartan  50 mg Oral QAM     metoprolol tartrate  25 mg Oral BID     polyethylene glycol  17 g Oral Daily     rosuvastatin  20 mg Oral QPM     senna-docusate  1 tablet Oral BID     sodium chloride (PF)  3 mL Intracatheter Q8H

## 2022-09-08 NOTE — PLAN OF CARE
Goal Outcome Evaluation:    Patient discharging home with family. AVS gone over with patient in room and prescriptions given to patient. All Questions answered.

## 2022-09-09 NOTE — PROGRESS NOTES
VASCULAR SURGERY    Further information on Smiley Harris for chart documentation.  History of prior coronary bypass graft and dilated heart but good functional capacity preoperatively.      On postoperative day #1 she was complaining of acute episode of chest pain.  In reality this was more left sided back pain most likely from positioning for her carotid endarterectomy.  History of atrial fibrillation and RVR in the past and no longer taking Eliquis.  Patient had complained of this discomfort the night of her surgery.    Due to the symptoms a RRT was called early in the morning of 9/7/2022.  With her cardiac history laboratory tests were drawn.  Troponin was negative.  EKG was unremarkable.  BNP however was elevated raising the possibility of CHF.  Chest x-ray revealed a chronic dilated heart with mild central venous congestion consistent with congestive heart failure.    Patient has a history of chronic mild renal insufficiency with SCr= 1.70.  She was voiding with no difficulty with adequate output.  Breath sounds were clear we decided no specific treatment such as IV Lasix was indicated.    Patient was followed clinically and his back pain did improve with positioning and topical lidocaine pads.  We did not feel this was cardiac in etiology.    Echocardiogram was performed on 9/7/2022.  EF= 55 to 60%.  Felt mildly reduced right ventricular function along with mild pulmonary hypertension.  IVC diameter was intermediate range but not in the range consistent with severe CHF.       Peter Mazariegos MD

## 2022-09-21 NOTE — PROGRESS NOTES
[place] [facility]    Smiley Harris returns for first postoperative follow-up.  Has a history of carotid disease and is undergone a prior .  Left CEA with excellent results.  We followed a progressively increasing asymptomatic right carotid stenosis which had become greater than 70%.  On 9/6/2022 underwent a right CEA with distal facial vein patch.    She complained of chest pain on the first postoperative night which actually was more back pain likely from positioning.  RRT was called due to her history of CABG.  Work-up was negative.  However, BNP was somewhat elevated indicative of mild chronic CHF--allowed spontaneous diuresis with SCr= 1.7   We decided to observe her and she did very well.  Able to be discharged on 9/8/2022 with her family.  Recommended outpatient Zio patch per cardiology.  Echo performed.  Discharged on chronic Eliquis and changed aspirin to Plavix      LDL= 133 (placed on Crestor) A1c= 5.7    She has done well postoperatively.  Some mild mandibular numbness.  She noticed this on the left side to complete resolved.  She did have the left posterior lateral back pain as mentioned above most likely from positioning and laying on the OR table that is now completely resolved.    She does notice some tingling in her feet with ambulation.    Exam: Alert and appropriate.  Normal affect.   Here with her son   Blood pressure 144/81.  Pulse 101   Healing right carotid incision with mild ecchymosis    And some swelling at the proximal portion that soft.  No large hematoma                Normal neurological exam.    IMPRESSION: #1.  Doing well following right CEA.  Some mild swelling that will resolve with time.  Follow-up duplex in 3 months.       #2.  Previous left CEA.  She is due for follow-up in 3 months also.     #3.  Hyperlipidemia.  She has initiated statins using Crestor.  She had problems in the past and is now taking this every other day and will gradually increase the dosage to  daily. (Her son is also on Crestor)     #4.  Some mild peripheral neuropathy.  Have recommended supplemental vitamins including folate and B12 to see if this is helpful.       #5.  Both of her brothers had carotid disease including Lefty who is a patient of mine.  Thus, there can be some familial issues and recommended that her son has a screening test performed.    Will see the patient again in 3 months.       Peter Mazariegos MD  This note was created using Dragon voice recognition software which may result in transcription errors.

## 2022-09-22 ENCOUNTER — OFFICE VISIT (OUTPATIENT)
Dept: OTHER | Facility: CLINIC | Age: 82
End: 2022-09-22
Attending: SURGERY
Payer: MEDICARE

## 2022-09-22 VITALS — OXYGEN SATURATION: 95 % | SYSTOLIC BLOOD PRESSURE: 144 MMHG | HEART RATE: 101 BPM | DIASTOLIC BLOOD PRESSURE: 81 MMHG

## 2022-09-22 DIAGNOSIS — I65.22 CAROTID STENOSIS, ASYMPTOMATIC, LEFT: ICD-10-CM

## 2022-09-22 DIAGNOSIS — I65.21 CAROTID ARTERY STENOSIS, ASYMPTOMATIC, RIGHT: Primary | ICD-10-CM

## 2022-09-22 DIAGNOSIS — E78.5 HYPERLIPIDEMIA LDL GOAL <70: ICD-10-CM

## 2022-09-22 PROCEDURE — 99024 POSTOP FOLLOW-UP VISIT: CPT | Performed by: SURGERY

## 2022-09-22 PROCEDURE — G0463 HOSPITAL OUTPT CLINIC VISIT: HCPCS

## 2022-09-22 NOTE — PROGRESS NOTES
Patient is here to discuss post op.    BP (!) 144/81 (BP Location: Right arm, Patient Position: Chair, Cuff Size: Adult Regular)   Pulse 101   LMP  (LMP Unknown)   SpO2 95%     Questions patient would like addressed today are: N/A.    Refills are needed: N/A    Has homecare services and agency name:  [facility]

## 2022-10-01 DIAGNOSIS — I65.21 CAROTID STENOSIS, ASYMPTOMATIC, RIGHT: ICD-10-CM

## 2022-10-03 RX ORDER — CLOPIDOGREL BISULFATE 75 MG/1
TABLET ORAL
Qty: 90 TABLET | Refills: 3 | Status: SHIPPED | OUTPATIENT
Start: 2022-10-03 | End: 2023-09-25

## 2022-10-03 NOTE — TELEPHONE ENCOUNTER
Med and pharm loaded, routing to MD for approval.   LOV 9/22/22  Future Office Visit: in 3 months from 9/22/22.        ANTONY Rowley, RN  [facility]  Office:  [phone] Fax: [phone]

## 2022-11-19 ENCOUNTER — HEALTH MAINTENANCE LETTER (OUTPATIENT)
Age: 82
End: 2022-11-19

## 2022-12-06 NOTE — PROGRESS NOTES
[place] [facility]    Smiley Harris returns for vascular follow-up for her carotid disease.  9/9/2020 left CEA with external jugular vein patch.  9/6/2022 right CEA with distal facial vein patch due to progressive stenosis greater than 70%      PMH: Medications: Cozaar, Lopressor, HCTZ, Prilosec, Crestor, Eliquis, Plavix, Synthroid     Medical: Hypertension    Hyperlipidemia on statin- 9/8/2022 LDL= 133    Hypothyroidism    No diabetes with A1c= 5.7    CHF with elevated BNP 9/8/2022    PRESENT SITUATION: No problems at all since her surgery.  Nose mandibular numbness.   Her cardiologist wanted her on the low-dose Eliquis and Plavix and this will be continued.     LDL was quite elevated the time of surgery but she had been on Crestor that they decreased to 10 mg a day.  Recommend increase this to 20 mg a day but she has not yet done so.    Exam: Alert and appropriate.  Normal affect.  Here with her son.   Blood pressure 136/81 right arm.  Pulse 160   Well-healing carotid incision.   Chest= clear with no rhonchi or rales    Bilateral carotid duplex today reveals some mild wall thickening but no stenosis in either carotid.  Right ICA PSV= 121 cm/s.  Left ICA PSV= 120 cm/s.          IMPRESSION: #1.  Widely patent bilateral carotid arteries.  Follow-up carotid duplex in 1 year.  With underlying cardiac situation we will continue both on low-dose Eliquis 2.5 mg twice daily and Plavix.     #2.  Hyperlipidemia.  She is running out of her Crestor and I will reorder 20 mg tablets that she will take daily and follow-up with Dr. Casper.    20 minutes with patient and son today.    Peter Mazariegos MD   This note was created using Dragon voice recognition software which may result in transcription errors.    CC: Dr. Rosa Maria Casper

## 2022-12-08 ENCOUNTER — OFFICE VISIT (OUTPATIENT)
Dept: OTHER | Facility: CLINIC | Age: 82
End: 2022-12-08
Attending: SURGERY
Payer: MEDICARE

## 2022-12-08 ENCOUNTER — HOSPITAL ENCOUNTER (OUTPATIENT)
Dept: ULTRASOUND IMAGING | Facility: CLINIC | Age: 82
Discharge: HOME | End: 2022-12-08
Attending: SURGERY
Payer: MEDICARE

## 2022-12-08 VITALS — DIASTOLIC BLOOD PRESSURE: 81 MMHG | SYSTOLIC BLOOD PRESSURE: 136 MMHG | HEART RATE: 106 BPM | OXYGEN SATURATION: 99 %

## 2022-12-08 DIAGNOSIS — E78.5 HYPERLIPIDEMIA LDL GOAL <70: ICD-10-CM

## 2022-12-08 DIAGNOSIS — Z98.890 HISTORY OF RIGHT-SIDED CAROTID ENDARTERECTOMY: Primary | ICD-10-CM

## 2022-12-08 DIAGNOSIS — I65.22 CAROTID STENOSIS, ASYMPTOMATIC, LEFT: ICD-10-CM

## 2022-12-08 DIAGNOSIS — I65.23 CAROTID STENOSIS, ASYMPTOMATIC, BILATERAL: ICD-10-CM

## 2022-12-08 DIAGNOSIS — I65.21 CAROTID ARTERY STENOSIS, ASYMPTOMATIC, RIGHT: ICD-10-CM

## 2022-12-08 PROCEDURE — G0463 HOSPITAL OUTPT CLINIC VISIT: HCPCS

## 2022-12-08 PROCEDURE — 93880 EXTRACRANIAL BILAT STUDY: CPT

## 2022-12-08 PROCEDURE — 99213 OFFICE O/P EST LOW 20 MIN: CPT | Performed by: SURGERY

## 2022-12-08 RX ORDER — ROSUVASTATIN CALCIUM 20 MG/1
20 TABLET, COATED ORAL DAILY
Qty: 90 TABLET | Refills: 3 | Status: SHIPPED | OUTPATIENT
Start: 2022-12-08

## 2022-12-08 NOTE — PROGRESS NOTES
Patient is here to discuss follow up.    /81 (BP Location: Right arm, Patient Position: Chair, Cuff Size: Adult Large)   Pulse 106   LMP  (LMP Unknown)   SpO2 99%     Questions patient would like addressed today are: N/A.    Refills are needed: No    Has homecare services and agency name:  [facility]

## 2023-07-01 ENCOUNTER — HEALTH MAINTENANCE LETTER (OUTPATIENT)
Age: 83
End: 2023-07-01

## 2023-09-23 DIAGNOSIS — I65.21 CAROTID STENOSIS, ASYMPTOMATIC, RIGHT: ICD-10-CM

## 2023-09-25 RX ORDER — CLOPIDOGREL BISULFATE 75 MG/1
TABLET ORAL
Qty: 90 TABLET | Refills: 1 | Status: SHIPPED | OUTPATIENT
Start: 2023-09-25

## 2023-09-25 NOTE — TELEPHONE ENCOUNTER
clopidogrel (PLAVIX) 75 MG tablet   Last Written Prescription Date:  10/3/22  Last Fill Quantity: 90,  # refills: 3    Last visit with provider:  12/8/2022  Follow up recommended:  Follow-up carotid duplex in 1 year. With underlying cardiac situation we will continue both on low-dose Eliquis 2.5 mg twice daily and Plavix.     Unable to fill per [facility] protocol.  Medication and pharmacy loaded.    Plavix [id number] 03:29 AM   Protocol Details No active PPI on record unless is Protonix    Normal HGB on file in past 12 months    Normal Platelets on file in past 12 months

## 2023-09-29 ENCOUNTER — TELEPHONE (OUTPATIENT)
Dept: OTHER | Facility: CLINIC | Age: 83
End: 2023-09-29
Payer: MEDICARE

## 2023-09-29 NOTE — TELEPHONE ENCOUNTER
Per medication review, Plavix was refilled on 9/25/23.        Per Dr. Mazariegos's LOV on 12/8/22:   Her cardiologist wanted her on the low-dose Eliquis and Plavix and this will be continued.     I spoke with patient and discussed this prescription was sent on 9/25/23 and future refills should be through her cardiologist to maintain continuity of care.  Patient verbalized understanding.  Pt stated the pharmacy told her she had no prescriptions available.  I offered to speak with the pharmacy to clarify and the patient greatly appreciated this.    I reached out to [facility]'s pharmacy.  Pharmacy technician stated the refill was denied by insurance and the pharmacy is still working on this.  The technician stated the patient should receive a notification via text message when it is available for .    I returned call to patient and discussed above.  I advised her to call the pharmacy at the end of day to see if they prescription had been approved.  Patient stated she does not receive text messages.  Patient was in agreement to follow up with pharmacy later this evening for an update.  Patient appreciated the assistance and understands she can call back if any additional questions arise.    Tamera Mcmahon, LORENN, RN-[facility]

## 2023-11-29 ENCOUNTER — TELEPHONE (OUTPATIENT)
Dept: OTHER | Facility: CLINIC | Age: 83
End: 2023-11-29
Payer: MEDICARE

## 2024-08-24 ENCOUNTER — HEALTH MAINTENANCE LETTER (OUTPATIENT)
Age: 84
End: 2024-08-24

## (undated) DEVICE — SOL NACL 0.9% INJ 250ML BAG 2B1322Q

## (undated) DEVICE — SU PROLENE 7-0 BV-1DA 4X30" M8703

## (undated) DEVICE — SU PROLENE 6-0 C-1DA 30" 8706H

## (undated) DEVICE — IOM SUPPLIES

## (undated) DEVICE — GLOVE PROTEXIS W/NEU-THERA 7.5  2D73TE75

## (undated) DEVICE — SHUNT SUNDT 3X4X10CM NL850-5060

## (undated) DEVICE — SU SILK 4-0 TIE 12X30" A303H

## (undated) DEVICE — SYR 03ML LL W/O NDL 309657

## (undated) DEVICE — NDL 22GA 1.5"

## (undated) DEVICE — SU PROLENE 7-0 BV-1DA 4X24" M8702

## (undated) DEVICE — ESU GROUND PAD UNIVERSAL W/O CORD

## (undated) DEVICE — SURGICEL HEMOSTAT 2X3" 1953

## (undated) DEVICE — SU MONOCRYL 4-0 PS-2 18" UND Y496G

## (undated) DEVICE — PREP CHLORAPREP W/ORANGE TINT 10.5ML 930715

## (undated) DEVICE — PACK VASCULAR SCV15VAFSB

## (undated) DEVICE — BLADE KNIFE BEAVER MINI BEAVER6400

## (undated) DEVICE — IONM EEG SUPPLIES

## (undated) DEVICE — DRSG STERI STRIP 1/2X4" R1547

## (undated) DEVICE — SOL NACL 0.9% IRRIG 1000ML BOTTLE 2F7124

## (undated) DEVICE — SU VICRYL 3-0 SH 27" J316H

## (undated) DEVICE — DECANTER BAG 2002S

## (undated) DEVICE — IONM UP TO 7 HOURS

## (undated) DEVICE — SOL WATER IRRIG 1000ML BOTTLE 2F7114

## (undated) DEVICE — NDL ANGIOCATH 20GA 1.25" 4056

## (undated) DEVICE — LINEN TOWEL PACK X5 5464

## (undated) DEVICE — DECANTER VIAL 2006S

## (undated) DEVICE — NDL BLUNT 19GA 1.5"

## (undated) DEVICE — SYR 10ML FINGER CONTROL W/O NDL 309695

## (undated) DEVICE — DRAPE IOBAN INCISE 23X17" 6650EZ

## (undated) DEVICE — NDL 19GA 1.5"

## (undated) DEVICE — SUCTION CANISTER MEDIVAC LINER 3000ML W/LID 65651-530

## (undated) DEVICE — SU SILK 2-0 TIE 24" SA75H

## (undated) DEVICE — SU SILK 3-0 TIE 24" SA74H

## (undated) DEVICE — SYR 01ML 27GA 0.5" NDL TBC 309623

## (undated) RX ORDER — HYDRALAZINE HYDROCHLORIDE 20 MG/ML
INJECTION INTRAMUSCULAR; INTRAVENOUS
Status: DISPENSED
Start: 2020-09-09

## (undated) RX ORDER — CEFAZOLIN SODIUM 1 G/3ML
INJECTION, POWDER, FOR SOLUTION INTRAMUSCULAR; INTRAVENOUS
Status: DISPENSED
Start: 2020-09-09

## (undated) RX ORDER — REMIFENTANIL HYDROCHLORIDE 1 MG/ML
INJECTION, POWDER, LYOPHILIZED, FOR SOLUTION INTRAVENOUS
Status: DISPENSED
Start: 2022-09-06

## (undated) RX ORDER — HEPARIN SODIUM 1000 [USP'U]/ML
INJECTION, SOLUTION INTRAVENOUS; SUBCUTANEOUS
Status: DISPENSED
Start: 2020-09-09

## (undated) RX ORDER — ASPIRIN 600 MG/1
SUPPOSITORY RECTAL
Status: DISPENSED
Start: 2020-09-09

## (undated) RX ORDER — GLYCOPYRROLATE 0.2 MG/ML
INJECTION, SOLUTION INTRAMUSCULAR; INTRAVENOUS
Status: DISPENSED
Start: 2022-09-06

## (undated) RX ORDER — BUPIVACAINE HYDROCHLORIDE 5 MG/ML
INJECTION, SOLUTION EPIDURAL; INTRACAUDAL
Status: DISPENSED
Start: 2020-09-09

## (undated) RX ORDER — ONDANSETRON 2 MG/ML
INJECTION INTRAMUSCULAR; INTRAVENOUS
Status: DISPENSED
Start: 2020-09-09

## (undated) RX ORDER — ASPIRIN 81 MG/1
TABLET, CHEWABLE ORAL
Status: DISPENSED
Start: 2022-09-06

## (undated) RX ORDER — FENTANYL CITRATE 50 UG/ML
INJECTION, SOLUTION INTRAMUSCULAR; INTRAVENOUS
Status: DISPENSED
Start: 2022-09-06

## (undated) RX ORDER — HYDROMORPHONE HYDROCHLORIDE 1 MG/ML
INJECTION, SOLUTION INTRAMUSCULAR; INTRAVENOUS; SUBCUTANEOUS
Status: DISPENSED
Start: 2022-09-06

## (undated) RX ORDER — DEXAMETHASONE SODIUM PHOSPHATE 4 MG/ML
INJECTION, SOLUTION INTRA-ARTICULAR; INTRALESIONAL; INTRAMUSCULAR; INTRAVENOUS; SOFT TISSUE
Status: DISPENSED
Start: 2020-09-09

## (undated) RX ORDER — CEFAZOLIN SODIUM/WATER 2 G/20 ML
SYRINGE (ML) INTRAVENOUS
Status: DISPENSED
Start: 2022-09-06

## (undated) RX ORDER — NEOSTIGMINE METHYLSULFATE 1 MG/ML
VIAL (ML) INJECTION
Status: DISPENSED
Start: 2022-09-06

## (undated) RX ORDER — ONDANSETRON 2 MG/ML
INJECTION INTRAMUSCULAR; INTRAVENOUS
Status: DISPENSED
Start: 2022-09-06

## (undated) RX ORDER — DEXAMETHASONE SODIUM PHOSPHATE 4 MG/ML
INJECTION, SOLUTION INTRA-ARTICULAR; INTRALESIONAL; INTRAMUSCULAR; INTRAVENOUS; SOFT TISSUE
Status: DISPENSED
Start: 2022-09-06

## (undated) RX ORDER — LIDOCAINE HYDROCHLORIDE 20 MG/ML
INJECTION, SOLUTION EPIDURAL; INFILTRATION; INTRACAUDAL; PERINEURAL
Status: DISPENSED
Start: 2022-09-06

## (undated) RX ORDER — PROPOFOL 10 MG/ML
INJECTION, EMULSION INTRAVENOUS
Status: DISPENSED
Start: 2022-09-06

## (undated) RX ORDER — FENTANYL CITRATE 50 UG/ML
INJECTION, SOLUTION INTRAMUSCULAR; INTRAVENOUS
Status: DISPENSED
Start: 2020-09-09

## (undated) RX ORDER — PROPOFOL 10 MG/ML
INJECTION, EMULSION INTRAVENOUS
Status: DISPENSED
Start: 2020-09-09

## (undated) RX ORDER — LIDOCAINE HYDROCHLORIDE 20 MG/ML
INJECTION, SOLUTION EPIDURAL; INFILTRATION; INTRACAUDAL; PERINEURAL
Status: DISPENSED
Start: 2020-09-09

## (undated) RX ORDER — KETOROLAC TROMETHAMINE 30 MG/ML
INJECTION, SOLUTION INTRAMUSCULAR; INTRAVENOUS
Status: DISPENSED
Start: 2020-09-09

## (undated) RX ORDER — ACETAMINOPHEN 325 MG/1
TABLET ORAL
Status: DISPENSED
Start: 2022-09-06